# Patient Record
Sex: FEMALE | Race: WHITE | NOT HISPANIC OR LATINO | Employment: FULL TIME | URBAN - METROPOLITAN AREA
[De-identification: names, ages, dates, MRNs, and addresses within clinical notes are randomized per-mention and may not be internally consistent; named-entity substitution may affect disease eponyms.]

---

## 2023-06-26 ENCOUNTER — OFFICE VISIT (OUTPATIENT)
Dept: PRIMARY CARE | Facility: CLINIC | Age: 58
End: 2023-06-26
Payer: COMMERCIAL

## 2023-06-26 VITALS
DIASTOLIC BLOOD PRESSURE: 70 MMHG | WEIGHT: 149 LBS | BODY MASS INDEX: 24.79 KG/M2 | SYSTOLIC BLOOD PRESSURE: 122 MMHG | OXYGEN SATURATION: 99 % | HEART RATE: 73 BPM

## 2023-06-26 DIAGNOSIS — Z12.39 BREAST SCREENING: ICD-10-CM

## 2023-06-26 DIAGNOSIS — Z00.00 PREVENTATIVE HEALTH CARE: Primary | ICD-10-CM

## 2023-06-26 DIAGNOSIS — R79.89 LOW VITAMIN D LEVEL: ICD-10-CM

## 2023-06-26 DIAGNOSIS — F41.9 ANXIETY: ICD-10-CM

## 2023-06-26 DIAGNOSIS — E03.9 HYPOTHYROIDISM, UNSPECIFIED TYPE: ICD-10-CM

## 2023-06-26 DIAGNOSIS — R00.2 PALPITATIONS: ICD-10-CM

## 2023-06-26 PROCEDURE — 1036F TOBACCO NON-USER: CPT | Performed by: FAMILY MEDICINE

## 2023-06-26 PROCEDURE — 99214 OFFICE O/P EST MOD 30 MIN: CPT | Performed by: FAMILY MEDICINE

## 2023-06-26 RX ORDER — BUSPIRONE HYDROCHLORIDE 5 MG/1
10 TABLET ORAL 2 TIMES DAILY PRN
Qty: 120 TABLET | Refills: 0 | Status: SHIPPED | OUTPATIENT
Start: 2023-06-26 | End: 2023-07-24

## 2023-06-26 RX ORDER — SERTRALINE HYDROCHLORIDE 25 MG/1
TABLET, FILM COATED ORAL
COMMUNITY
End: 2024-05-13 | Stop reason: SDUPTHER

## 2023-06-26 RX ORDER — CYCLOBENZAPRINE HCL 5 MG
5 TABLET ORAL 3 TIMES DAILY PRN
COMMUNITY

## 2023-06-26 RX ORDER — LEVOTHYROXINE SODIUM 25 UG/1
25 TABLET ORAL DAILY
COMMUNITY
End: 2023-07-24 | Stop reason: SDUPTHER

## 2023-06-26 ASSESSMENT — PATIENT HEALTH QUESTIONNAIRE - PHQ9
SUM OF ALL RESPONSES TO PHQ9 QUESTIONS 1 AND 2: 0
2. FEELING DOWN, DEPRESSED OR HOPELESS: NOT AT ALL
1. LITTLE INTEREST OR PLEASURE IN DOING THINGS: NOT AT ALL

## 2023-06-26 NOTE — PATIENT INSTRUCTIONS
Please get updated fasting blood work    Please also get the Zio patch placed in cardiology.     Please start a magnesium glycinate 400 mg at bedtime or restart the Calm at bedtime.     Please also continue the sertraline 100 mg daily.     You can also start the buspar as needed for panic/ anxiety. ( We could always consider a low dose of propranolol 10 mg as needed)     Mammogram was due in October 2023.     Please follow up in 6 weeks.

## 2023-06-26 NOTE — PROGRESS NOTES
Subjective   Christen Guerra is a 57 y.o. female who presents for Anxiety (Increase in anxiety / heart palpitations in last week, sob when working out).    HPI    #) palpitation  - occasionally wake from sleep   - no chest pains  - increased stress at home and at work   - started about one week ago   - fever, chills  - sleep not great, gets up around 2-3 am every am and then can fall back to sleep for 1+ hours.   - exercise 5 days per week   - no dizziness  - no chest pains  - chest heaviness associated with the palpitations   - no arrhythmia in the family    #) Anxiety with insomnia - still having break through symptoms  - on sertraline 25 mg 4 times per day   - diff falling and staying asleep     #) low thyroid- recheck TSH now   - on levothyroxine 25 mcg     #) Preventative   PAP 5/30/19 ASCUS/ HPV NEG  MAMMO 10/12/2021  DEXA NEVER  COLON 5-14-18 ( 10 YEARS )     ROS was completed and all systems are negative with the exception of what was noted in the the HPI.     Objective     /70   Pulse 73   Wt 67.6 kg (149 lb)   SpO2 99%   BMI 24.79 kg/m²      Physical Exam  GEN: A+O, no acute distress  HEENT: NC/AT, Oropharynx clear, no exudates, TM visualized, Extraoccular muscles intact, no facial droop; no thyromegaly or cervical LAD  RESP: CTAB, no wheezes   CV: RRR, no murmurs  ABD: soft, non-tender, + BS  SKIN: no rashes or bruising, no peripheral edema   NEURO: CN II-XII grossly intact, moves all extremities equally, no tremor   PSYCH: normal affect, appropriate mood     Assessment/Plan   Problem List Items Addressed This Visit    None    Please get updated fasting blood work    Please also get the Zio patch placed in cardiology.     Please start a magnesium glycinate 400 mg at bedtime or restart the Calm at bedtime.     Please also continue the sertraline 100 mg daily.     You can also start the buspar as needed for panic/ anxiety. ( We could always consider a low dose of propranolol 10 mg as needed)      Mammogram was due in October 2023.     Please follow up in 6 weeks.        Chely Jean DO, [facility], [facility]  [address]   [address]   [place] [address]  Ph. [phone]  Fx. [phone]

## 2023-07-06 ENCOUNTER — LAB (OUTPATIENT)
Dept: LAB | Facility: LAB | Age: 58
End: 2023-07-06
Payer: COMMERCIAL

## 2023-07-06 DIAGNOSIS — Z00.00 PREVENTATIVE HEALTH CARE: ICD-10-CM

## 2023-07-06 DIAGNOSIS — E03.9 HYPOTHYROIDISM, UNSPECIFIED TYPE: ICD-10-CM

## 2023-07-06 DIAGNOSIS — R00.2 PALPITATIONS: ICD-10-CM

## 2023-07-06 DIAGNOSIS — R79.89 LOW VITAMIN D LEVEL: ICD-10-CM

## 2023-07-06 LAB
ALANINE AMINOTRANSFERASE (SGPT) (U/L) IN SER/PLAS: 16 U/L (ref 7–45)
ALBUMIN (G/DL) IN SER/PLAS: 4.6 G/DL (ref 3.4–5)
ALKALINE PHOSPHATASE (U/L) IN SER/PLAS: 41 U/L (ref 33–110)
ANION GAP IN SER/PLAS: 10 MMOL/L (ref 10–20)
ASPARTATE AMINOTRANSFERASE (SGOT) (U/L) IN SER/PLAS: 23 U/L (ref 9–39)
BASOPHILS (10*3/UL) IN BLOOD BY AUTOMATED COUNT: 0.05 X10E9/L (ref 0–0.1)
BASOPHILS/100 LEUKOCYTES IN BLOOD BY AUTOMATED COUNT: 1.1 % (ref 0–2)
BILIRUBIN TOTAL (MG/DL) IN SER/PLAS: 0.7 MG/DL (ref 0–1.2)
CALCIDIOL (25 OH VITAMIN D3) (NG/ML) IN SER/PLAS: 33 NG/ML
CALCIUM (MG/DL) IN SER/PLAS: 9.3 MG/DL (ref 8.6–10.3)
CARBON DIOXIDE, TOTAL (MMOL/L) IN SER/PLAS: 28 MMOL/L (ref 21–32)
CHLORIDE (MMOL/L) IN SER/PLAS: 103 MMOL/L (ref 98–107)
CHOLESTEROL (MG/DL) IN SER/PLAS: 224 MG/DL (ref 0–199)
CHOLESTEROL IN HDL (MG/DL) IN SER/PLAS: 82.3 MG/DL
CHOLESTEROL/HDL RATIO: 2.7
CREATININE (MG/DL) IN SER/PLAS: 0.82 MG/DL (ref 0.5–1.05)
EOSINOPHILS (10*3/UL) IN BLOOD BY AUTOMATED COUNT: 0.06 X10E9/L (ref 0–0.7)
EOSINOPHILS/100 LEUKOCYTES IN BLOOD BY AUTOMATED COUNT: 1.3 % (ref 0–6)
ERYTHROCYTE DISTRIBUTION WIDTH (RATIO) BY AUTOMATED COUNT: 13.3 % (ref 11.5–14.5)
ERYTHROCYTE MEAN CORPUSCULAR HEMOGLOBIN CONCENTRATION (G/DL) BY AUTOMATED: 32.9 G/DL (ref 32–36)
ERYTHROCYTE MEAN CORPUSCULAR VOLUME (FL) BY AUTOMATED COUNT: 91 FL (ref 80–100)
ERYTHROCYTES (10*6/UL) IN BLOOD BY AUTOMATED COUNT: 4.6 X10E12/L (ref 4–5.2)
GFR FEMALE: 83 ML/MIN/1.73M2
GLUCOSE (MG/DL) IN SER/PLAS: 79 MG/DL (ref 74–99)
HEMATOCRIT (%) IN BLOOD BY AUTOMATED COUNT: 42 % (ref 36–46)
HEMOGLOBIN (G/DL) IN BLOOD: 13.8 G/DL (ref 12–16)
IMMATURE GRANULOCYTES/100 LEUKOCYTES IN BLOOD BY AUTOMATED COUNT: 0.2 % (ref 0–0.9)
LDL: 119 MG/DL (ref 0–99)
LEUKOCYTES (10*3/UL) IN BLOOD BY AUTOMATED COUNT: 4.5 X10E9/L (ref 4.4–11.3)
LYMPHOCYTES (10*3/UL) IN BLOOD BY AUTOMATED COUNT: 1.85 X10E9/L (ref 1.2–4.8)
LYMPHOCYTES/100 LEUKOCYTES IN BLOOD BY AUTOMATED COUNT: 40.7 % (ref 13–44)
MAGNESIUM (MG/DL) IN SER/PLAS: 2.11 MG/DL (ref 1.6–2.4)
MONOCYTES (10*3/UL) IN BLOOD BY AUTOMATED COUNT: 0.4 X10E9/L (ref 0.1–1)
MONOCYTES/100 LEUKOCYTES IN BLOOD BY AUTOMATED COUNT: 8.8 % (ref 2–10)
NEUTROPHILS (10*3/UL) IN BLOOD BY AUTOMATED COUNT: 2.17 X10E9/L (ref 1.2–7.7)
NEUTROPHILS/100 LEUKOCYTES IN BLOOD BY AUTOMATED COUNT: 47.9 % (ref 40–80)
PLATELETS (10*3/UL) IN BLOOD AUTOMATED COUNT: 251 X10E9/L (ref 150–450)
POTASSIUM (MMOL/L) IN SER/PLAS: 3.8 MMOL/L (ref 3.5–5.3)
PROTEIN TOTAL: 7.1 G/DL (ref 6.4–8.2)
SODIUM (MMOL/L) IN SER/PLAS: 137 MMOL/L (ref 136–145)
THYROPEROXIDASE AB (IU/ML) IN SER/PLAS: 36 IU/ML
THYROTROPIN (MIU/L) IN SER/PLAS BY DETECTION LIMIT <= 0.05 MIU/L: 4.31 MIU/L (ref 0.44–3.98)
THYROXINE (T4) FREE (NG/DL) IN SER/PLAS: 0.86 NG/DL (ref 0.61–1.12)
TRIGLYCERIDE (MG/DL) IN SER/PLAS: 116 MG/DL (ref 0–149)
TRIIODOTHYRONINE (T3) FREE (PG/ML) IN SER/PLAS: 3 PG/ML (ref 2.3–4.2)
UREA NITROGEN (MG/DL) IN SER/PLAS: 14 MG/DL (ref 6–23)
VLDL: 23 MG/DL (ref 0–40)

## 2023-07-06 PROCEDURE — 80061 LIPID PANEL: CPT

## 2023-07-06 PROCEDURE — 83735 ASSAY OF MAGNESIUM: CPT

## 2023-07-06 PROCEDURE — 86376 MICROSOMAL ANTIBODY EACH: CPT

## 2023-07-06 PROCEDURE — 80053 COMPREHEN METABOLIC PANEL: CPT

## 2023-07-06 PROCEDURE — 36415 COLL VENOUS BLD VENIPUNCTURE: CPT

## 2023-07-06 PROCEDURE — 85025 COMPLETE CBC W/AUTO DIFF WBC: CPT

## 2023-07-06 PROCEDURE — 86800 THYROGLOBULIN ANTIBODY: CPT

## 2023-07-06 PROCEDURE — 84481 FREE ASSAY (FT-3): CPT

## 2023-07-06 PROCEDURE — 82306 VITAMIN D 25 HYDROXY: CPT

## 2023-07-06 PROCEDURE — 84439 ASSAY OF FREE THYROXINE: CPT

## 2023-07-06 PROCEDURE — 84443 ASSAY THYROID STIM HORMONE: CPT

## 2023-07-10 LAB — THYROGLOBULIN AB (IU/ML) IN SER/PLAS: <0.9 IU/ML (ref 0–4)

## 2023-07-23 DIAGNOSIS — R00.2 PALPITATIONS: ICD-10-CM

## 2023-07-23 DIAGNOSIS — E03.9 HYPOTHYROIDISM, UNSPECIFIED TYPE: ICD-10-CM

## 2023-07-23 DIAGNOSIS — F41.9 ANXIETY: ICD-10-CM

## 2023-07-24 RX ORDER — BUSPIRONE HYDROCHLORIDE 5 MG/1
10 TABLET ORAL 2 TIMES DAILY
Qty: 360 TABLET | Refills: 3 | Status: SHIPPED | OUTPATIENT
Start: 2023-07-24 | End: 2024-07-23

## 2023-07-24 RX ORDER — LEVOTHYROXINE SODIUM 25 UG/1
37.5 TABLET ORAL DAILY
Qty: 135 TABLET | Refills: 1 | Status: SHIPPED | OUTPATIENT
Start: 2023-07-24 | End: 2024-02-26

## 2023-08-01 ENCOUNTER — APPOINTMENT (OUTPATIENT)
Dept: PRIMARY CARE | Facility: CLINIC | Age: 58
End: 2023-08-01
Payer: COMMERCIAL

## 2023-09-07 ENCOUNTER — OFFICE VISIT (OUTPATIENT)
Dept: PRIMARY CARE | Facility: CLINIC | Age: 58
End: 2023-09-07
Payer: COMMERCIAL

## 2023-09-07 VITALS
OXYGEN SATURATION: 98 % | WEIGHT: 150 LBS | SYSTOLIC BLOOD PRESSURE: 116 MMHG | BODY MASS INDEX: 24.96 KG/M2 | DIASTOLIC BLOOD PRESSURE: 70 MMHG | HEART RATE: 72 BPM

## 2023-09-07 DIAGNOSIS — R00.2 PALPITATIONS: Primary | ICD-10-CM

## 2023-09-07 DIAGNOSIS — F41.9 ANXIETY: ICD-10-CM

## 2023-09-07 DIAGNOSIS — Z12.39 BREAST SCREENING: ICD-10-CM

## 2023-09-07 DIAGNOSIS — R07.89 ATYPICAL CHEST PAIN: ICD-10-CM

## 2023-09-07 PROCEDURE — 1036F TOBACCO NON-USER: CPT | Performed by: FAMILY MEDICINE

## 2023-09-07 PROCEDURE — 99214 OFFICE O/P EST MOD 30 MIN: CPT | Performed by: FAMILY MEDICINE

## 2023-09-07 RX ORDER — PROPRANOLOL HYDROCHLORIDE 60 MG/1
60 CAPSULE, EXTENDED RELEASE ORAL DAILY
Qty: 30 CAPSULE | Refills: 11 | Status: SHIPPED | OUTPATIENT
Start: 2023-09-07 | End: 2023-10-03

## 2023-09-07 ASSESSMENT — PATIENT HEALTH QUESTIONNAIRE - PHQ9
SUM OF ALL RESPONSES TO PHQ9 QUESTIONS 1 AND 2: 0
1. LITTLE INTEREST OR PLEASURE IN DOING THINGS: NOT AT ALL
2. FEELING DOWN, DEPRESSED OR HOPELESS: NOT AT ALL

## 2023-09-07 NOTE — PROGRESS NOTES
Subjective   Christen Guerra is a 57 y.o. female who presents for Follow-up (Follow up on holter).    HPI    #) palpitation  - with heaviness in the chest, needs to stop more to cartch the breath   - had COVID 1 year ago in May (16 months ago)  took a while to recover  - occasionally wake from sleep - lousy sleep  - not much snoring, cyclical sleep patterns, maybe feels hormonal   - increased stress at home and at work   - sleep not great, gets up around 2-3 am every am and then can fall back to sleep for 1+ hours.   - exercise 5 days per week   - no dizziness  - no chest pains, no cough  - slight wheezing sensation  - chest heaviness associated with the palpitations   - no arrhythmia in the family    #) Anxiety with insomnia - still having break through symptoms  - on sertraline 25 mg 4 times per day   - will trial the propranolol at bedtime.  - diff falling and staying asleep     #) low thyroid- recheck TSH now   - on levothyroxine 25 mcg     #) Preventative   PAP 5/30/19 ASCUS/ HPV NEG  MAMMO 10/12/2021  DEXA NEVER  COLON 5-14-18 ( 10 YEARS )     ROS was completed and all systems are negative with the exception of what was noted in the the HPI.     Objective     /70   Pulse 72   Wt 68 kg (150 lb)   SpO2 98%   BMI 24.96 kg/m²      Physical Exam  GEN: A+O, no acute distress  HEENT: NC/AT, Oropharynx clear, no exudates, TM visualized, Extraoccular muscles intact, no facial droop; no thyromegaly or cervical LAD  RESP: CTAB, no wheezes   CV: RRR, no murmurs  ABD: soft, non-tender, + BS  SKIN: no rashes or bruising, no peripheral edema   NEURO: CN II-XII grossly intact, moves all extremities equally, no tremor   PSYCH: normal affect, appropriate mood     Assessment/Plan     Labs reviewed  Even though your total cholesterol levels went up, it is because you have amazing HDL (good cholesterol levels at 82). Your TSH was now slightly elevated, with negative thyroid antibodies, and a Free T3 of 3. And T4 of 0.86  (both low normal ranges).... we could consider a slight increase in the levothyroxine from 25 to 50? Normal blood counts, no evidence of anemia or infection, normal electrolytes, and your kidney function improved from a GFR of 78 to 83. Normal liver function, normal Magnesium. Vitamin D went from 27 to 33, goal is to get this around 60, please continue supplement     We reviewed you Zio patch, looked good  Please schedule the stress test.     Please continue the magnesium glycinate 400 mg at bedtime    Please also continue the sertraline 100 mg daily.     Please start the propranolol at bedtime for the palpations, anxiety and insomnia.     Mammogram was due in October 2023. Order given.     Please follow up in 2-3 months        Chely Jean DO, [facility], [facility]  [address]   [address]   [place] [address]  Ph. [phone]  Fx. [phone]

## 2023-09-07 NOTE — PATIENT INSTRUCTIONS
Labs reviewed  Even though your total cholesterol levels went up, it is because you have amazing HDL (good cholesterol levels at 82). Your TSH was now slightly elevated, with negative thyroid antibodies, and a Free T3 of 3. And T4 of 0.86 (both low normal ranges).... we could consider a slight increase in the levothyroxine from 25 to 50? Normal blood counts, no evidence of anemia or infection, normal electrolytes, and your kidney function improved from a GFR of 78 to 83. Normal liver function, normal Magnesium. Vitamin D went from 27 to 33, goal is to get this around 60, please continue supplement     We reviewed you Zio patch, looked good  Please schedule the stress test.     Please continue the magnesium glycinate 400 mg at bedtime    Please also continue the sertraline 100 mg daily.     Please start the propranolol at bedtime for the palpations, anxiety and insomnia.     Mammogram was due in October 2023. Order given.     Please follow up in 2-3 months

## 2023-09-21 ENCOUNTER — APPOINTMENT (OUTPATIENT)
Dept: PRIMARY CARE | Facility: CLINIC | Age: 58
End: 2023-09-21
Payer: COMMERCIAL

## 2023-10-02 DIAGNOSIS — R00.2 PALPITATIONS: ICD-10-CM

## 2023-10-02 DIAGNOSIS — F41.9 ANXIETY: ICD-10-CM

## 2023-10-03 ENCOUNTER — PHARMACY VISIT (OUTPATIENT)
Dept: PHARMACY | Facility: CLINIC | Age: 58
End: 2023-10-03
Payer: COMMERCIAL

## 2023-10-03 PROCEDURE — RXMED WILLOW AMBULATORY MEDICATION CHARGE

## 2023-10-03 RX ORDER — PROPRANOLOL HYDROCHLORIDE 60 MG/1
60 CAPSULE, EXTENDED RELEASE ORAL DAILY
Qty: 90 CAPSULE | Refills: 3 | Status: SHIPPED | OUTPATIENT
Start: 2023-10-03 | End: 2024-05-13 | Stop reason: WASHOUT

## 2023-10-04 ENCOUNTER — PHARMACY VISIT (OUTPATIENT)
Dept: PHARMACY | Facility: CLINIC | Age: 58
End: 2023-10-04
Payer: COMMERCIAL

## 2023-10-04 PROCEDURE — RXOTC WILLOW AMBULATORY OTC CHARGE

## 2023-10-04 PROCEDURE — RXMED WILLOW AMBULATORY MEDICATION CHARGE

## 2023-10-13 ENCOUNTER — PHARMACY VISIT (OUTPATIENT)
Dept: PHARMACY | Facility: CLINIC | Age: 58
End: 2023-10-13
Payer: COMMERCIAL

## 2023-10-13 ENCOUNTER — SPECIALTY PHARMACY (OUTPATIENT)
Dept: PHARMACY | Facility: CLINIC | Age: 58
End: 2023-10-13

## 2023-11-29 ENCOUNTER — TELEPHONE (OUTPATIENT)
Dept: PHYSICAL MEDICINE AND REHAB | Facility: CLINIC | Age: 58
End: 2023-11-29
Payer: COMMERCIAL

## 2023-11-29 ENCOUNTER — PHARMACY VISIT (OUTPATIENT)
Dept: PHARMACY | Facility: CLINIC | Age: 58
End: 2023-11-29
Payer: COMMERCIAL

## 2023-11-29 DIAGNOSIS — G24.3 CERVICAL DYSTONIA: Primary | ICD-10-CM

## 2023-11-29 NOTE — TELEPHONE ENCOUNTER
Patient scheduled an appt for more Xeomin injections for 12/28/23, states she canceled her last appointment to follow up on how the Xeomin was working because she was doing well and wants the same dosage ordered. According to aEMR you last ordered 150U, can you call this into [facility] please. PA still good.

## 2023-12-12 ENCOUNTER — SPECIALTY PHARMACY (OUTPATIENT)
Dept: PHARMACY | Facility: CLINIC | Age: 58
End: 2023-12-12

## 2023-12-12 DIAGNOSIS — G24.3 CERVICAL DYSTONIA: ICD-10-CM

## 2023-12-14 PROCEDURE — RXMED WILLOW AMBULATORY MEDICATION CHARGE

## 2023-12-26 ENCOUNTER — PHARMACY VISIT (OUTPATIENT)
Dept: PHARMACY | Facility: CLINIC | Age: 58
End: 2023-12-26
Payer: COMMERCIAL

## 2023-12-27 ENCOUNTER — TELEPHONE (OUTPATIENT)
Dept: PHYSICAL MEDICINE AND REHAB | Facility: CLINIC | Age: 58
End: 2023-12-27
Payer: COMMERCIAL

## 2023-12-27 NOTE — PATIENT INSTRUCTIONS
-Ice on and off for the next 24 hours if injection sites are sore. Do gentle range of motion exercises. Try to do them every hour for about half a minute or so, in every direction that the affected part goes. No pool, bath, or hot tub today. Avoid heavy lifting for the next 2 days.   -Continue Flexeril as needed   -consider other medications in the future  -Continue daily home exercises and strength training  -Consider referral for facet block/RFA versus cervical MIMI  -Consider repeat EMG in the future  -Follow-up 3 months for repeat xeomin injections

## 2023-12-27 NOTE — TELEPHONE ENCOUNTER
Received Xeomin 100U x 1 vial  NDC 7601391706  [facility]  Exp 12/2025  Lot [id number]    Xeomin 50U x 1 vial  NDC 4362626975  [facility]  Exp 02/2025  Lot [id number]

## 2023-12-27 NOTE — PROGRESS NOTES
Provider Impressions     This is a pleasant 58-year-old right-handed woman with past medical history significant for anxiety, hypothyroidism, who presents for follow-up of neck pain. Physical exam is notable for slight triceps weakness on the right, we will monitor closely. Symptoms and physical exam findings consistent with cervical spondylosis and reactive myofascial pain/tension, C7 radiculopathy.     -Bilateral neck and upper back Xeomin injections performed as above. There were no complications and she tolerated the procedure well. She was provided with postprocedure instructions.  -Continue Flexeril as needed   -consider other medications in the future  -Continue daily home exercises and strength training  -Consider referral for facet block/RFA versus cervical MIMI  -Consider repeat EMG in the future  -Follow-up 3 months for repeat xeomin injections      The patient expressed understanding and agreement with the assessment and plan. Patient encouraged to contact us should they have any questions, concerns, or any changes in symptoms.      Thank you for allowing me to participate in the care of your patient.        ** Dictated with voice recognition software, please forgive any errors in grammar and/or spelling **         Chief Complaint     Neck pain follow-up      History of Present Illness    This is a pleasant 58-year-old right-handed woman with past medical history significant for anxiety, hypothyroidism, who presents for follow-up neck pain.     She was last seen here on  8/25/2023 , at which point I did repeat bilateral upper back and neck trigger point  injections.    She is here today for repeat Xeomin injections.    Advised to continue Flexeril as needed and to continue daily home exercises.      She rates her pain as a 5/10, previously 4/10.      Otherwise, there have been no changes to her medications or past medical history since last visit.     ________________________  2/24/2023: Bilateral neck  "trigger point injections, try patches, consider other medications, consider other injections, consider Botox  3/24/2023: : Bilateral neck trigger point injections, try patches, consider other medications, consider other injections, consider Botox   5/24/2023: Bilateral neck xeomin injections,consider other medications, consider other injections, continue HEP  6/20/2023: We will increase Xeomin slightly next time, consider other injections and medications, continue HEP  7/18/23: Bilateral neck xeomin injections,consider other medications, consider other injections, continue HEP, slightly more xeomin next time in some of the muscles  8/25/2023: Bilateral neck trigger point injections, continue medications, consider other injections, continue exercises  12/28/2023:Bilateral neck Xeomin injections, continue medications, consider other injections, continue exercises  ______________________  As a reminder:     TIMELINE OF COMPLAINT(S):     She has been having neck pain on and off for the past 25 to 30 years. There was no inciting or traumatic event, but in her early 30s, she was knocked over by a sled rider and fell onto her head. She remembers starting to have neck issues after that. She said \"that is how I got my straight neck\". For the past 5 years, things have been getting worse slowly and she is now finding herself taking ibuprofen every other day and Flexeril a couple times a week at night which helps. The pain can radiate into her right temple at times and be associated with nausea as well.     It has been affecting her day-to-day life, work life, and sleep. Her primary care physician sent her here to consider trigger point injections.     Of note, she had right carpal tunnel release with Dr. Rios in 2011, helped.     Pain:  LOCATION-bilateral lateral neck R>L  RADIATION- Trapz feel tight  ASSOCIATED WITH- nausea, no falls  NUMBNESS/TINGLING-sometimes both hands feel tingling in the morning for the past 6 " "months  WEAKNESS-no  CONSTANT or INTERMITTENT-intermittent, but can wake up at night from it  SEVERITY/QUANTITY- 4/10, 7/10 at its worst, last time a couple of weeks ago  QUALITY- achy, pressure, gnawing  EXACERBATED BY- sometimes worse in the mornings, time  BETTER WITH- ibuprofen, heat, flexeril  TRIED-   Anti-Inflammatories: ibuprofen helps, Medrol Dosepak (not for this), previously meloxicam (doesn't remember) Celebrex (increases appetite)   Muscle relaxants: Flexeril helps   Anti-depressants:   Neuroleptics:   LDN:     PHYSICAL THERAPY: Several years ago, helped a little, still does HEP several times a week, does weighs at the gym on her own.  TENS unit: Yes, hasn't helped for neck. Helped for \"sciatica\"  CHIROPRACTIC MANIPULATION: Yes, temp relief  ACUPUNCTURE TREATMENTS: Yes, temp relief  DEEP TISSUE MASSAGE THERAPY: Yes, temp relief  OSTEOPATHIC MANIPULATION THERAPY: yes, Dr. Jean helps but wears off  INJECTIONS: R C-2-3-4 Cervical facet blocks Dr. Urbina 2/5/19, helped for several months but caused some some vertigo.   EMG/NCS: Dr. Son 4/26/11: Mild R CTS     IMAGING:     Cervical MRI 1/30/2023: Multilevel degenerative disc disease and facet arthrosis without significant spinal canal stenosis. There is moderate neuroforaminal narrowing at C6/C7, slightly increased degenerative changes since MRI from 2017.     Cervical spine x-ray 2/16/2017: Mild degenerative changes in several levels. There is straightening of the upper cervical curvature.     FUNCTIONAL HISTORY: The patient is independent in all ADLs, mobility, and driving. The patient does not use any assistive device.     SH:  Lives in: [place]  Lives with:   Occupation: Phlebotomist  Tobacco: No  Alcohol: Social, weekends  Drugs: No     _________________________  ROS: The patient denies any bowel or bladder incontinence/accidents, night sweats, fevers, chills, recent significant weight loss. A 14 point review of systems was reviewed with " the patient and is as above and otherwise negative. ROS questionnaire positive for neck pain       Physical Exam  GEN - Alert, well-developed, well-nourished, no acute distress  PSYCH - Cooperative, appropriate mood and affect  HEENT - NC/AT  RESP - Non-labored respirations, equal expansion  CV - warm and well-perfused, No cyanosis or edema in extremities.   ABD- soft, ND  SKIN - No rash.     Tenderness in bilateral scalene and SCM muscles     Previous NECK/EXTR- Cervical ROM is full with forward flexion, extension, rotation, but limited in sidebending right worse than left, with provocation of discomfort but no significant pain. There was discomfort with facet loading bilaterally.. Spurlings and Lhermitte's negative. No tenderness of the cervical spinous processes. No tenderness of the cervical paraspinal muscles and trapezei musculature but there was tenderness of bilateral scalene, SCM muscles.      Previous NEURO - UE strength 5/5 - including shoulder abduction, biceps, triceps (except 5 -/5 on the right), wrist extensors, finger flexors, interossei, and    Sensation - intact to light touch in bilateral upper extremities.   Reflexes - 2+ biceps, brachioradialis, triceps, reflexes bilaterally, Staley's negative bilaterally  GAIT - Normal base, normal stride length, non-antalgic. Able to toe walk and heel walk without difficulty.      Procedure  Xeomin 100 units/vial X 1 vial, 100 units used, 0 units wasted ( Pharmacy)  NDC 8240-8329-98  Lot: [id number]  Exp: 12/2025  Manuf: Jennifer     Xeomin 50 units/vial X 1 vial, 40 units used, 10 units wasted ( Pharmacy)  NDC 9977-8246-44  Lot: [id number]  Exp: 02/2025  Manuf: Jennifer     Sodium Chloride 0.9%- 1 cc's used, 8.5 cc's wasted X 1.5  10 mL Single dose  NDC: 6070-0509-55  LOT: [id number]  EXP: 2/01/2024  Manuf: Hospira        Botulinum toxin A injections:   Cervical dystonia     Counseling: We discussed the mechanism of action of botulinum toxin, the physiology of the  "neuromuscular junction. Over half of this 30 minute encounter was devoted to counseling and education.      Consent: The risks and benefits of the procedure were explained in great detail, including risks of allergic reaction, bruising, infection, too much reaction/weakness, no effect, damage to nearby structures. All questions were answered. Written, informed consent was obtained and placed in the chart.      Procedure in detail: \"Time out\" protocol was followed. The skin was prepped with alcohol, and using a sterile, 27 gauge, 2-inch electrode needle and EMG muscle localization using a Clavis, a total of 140 units of botulinum toxin A (1:1 botox saline mixture) was injected to the following muscles of the upper back and neck after negative aspiration:     Trapezius 35/35  Levator scapulae 20/20  Cervical paraspinal 5/5  Longissimus 5/5  Posterior scalene 5/5     The patient tolerated this well and was given post procedure instructions.      "

## 2023-12-28 ENCOUNTER — PROCEDURE VISIT (OUTPATIENT)
Dept: PHYSICAL MEDICINE AND REHAB | Facility: CLINIC | Age: 58
End: 2023-12-28
Payer: COMMERCIAL

## 2023-12-28 VITALS
BODY MASS INDEX: 25.16 KG/M2 | HEART RATE: 77 BPM | SYSTOLIC BLOOD PRESSURE: 129 MMHG | WEIGHT: 151 LBS | DIASTOLIC BLOOD PRESSURE: 73 MMHG | HEIGHT: 65 IN | OXYGEN SATURATION: 98 % | TEMPERATURE: 97.7 F

## 2023-12-28 DIAGNOSIS — G24.3 CERVICAL DYSTONIA: ICD-10-CM

## 2023-12-28 PROCEDURE — 95874 GUIDE NERV DESTR NEEDLE EMG: CPT | Performed by: PHYSICAL MEDICINE & REHABILITATION

## 2023-12-28 PROCEDURE — 64616 CHEMODENERV MUSC NECK DYSTON: CPT | Performed by: PHYSICAL MEDICINE & REHABILITATION

## 2023-12-28 ASSESSMENT — PAIN SCALES - GENERAL: PAINLEVEL: 5

## 2024-01-04 PROCEDURE — RXMED WILLOW AMBULATORY MEDICATION CHARGE

## 2024-01-06 ENCOUNTER — PHARMACY VISIT (OUTPATIENT)
Dept: PHARMACY | Facility: CLINIC | Age: 59
End: 2024-01-06
Payer: COMMERCIAL

## 2024-01-16 ENCOUNTER — APPOINTMENT (OUTPATIENT)
Dept: RADIOLOGY | Facility: CLINIC | Age: 59
End: 2024-01-16
Payer: COMMERCIAL

## 2024-01-19 ENCOUNTER — APPOINTMENT (OUTPATIENT)
Dept: RADIOLOGY | Facility: CLINIC | Age: 59
End: 2024-01-19
Payer: COMMERCIAL

## 2024-01-24 ENCOUNTER — TELEPHONE (OUTPATIENT)
Dept: PHYSICAL MEDICINE AND REHAB | Facility: CLINIC | Age: 59
End: 2024-01-24
Payer: COMMERCIAL

## 2024-01-24 NOTE — PROGRESS NOTES
Provider Impressions     This is a pleasant 58-year-old right-handed woman with past medical history significant for anxiety, hypothyroidism, who presents for follow-up of neck pain. Physical exam is notable for slight triceps weakness on the right, we will monitor closely. Symptoms and physical exam findings consistent with cervical spondylosis and reactive myofascial pain/tension, C7 radiculopathy.     -Bilateral neck and upper back trigger point injections performed as above. There were no complications and she tolerated the procedure well. She was provided with postprocedure instructions.  -Try gabapentin at night, up to 600 mg   -Try robaxin, up to 4 times per day  -Continue Flexeril as needed   -consider other medications in the future including medrol dose pack  -Consider switching to botox or xeomin.  -Continue daily home exercises and strength training  -Consider referral for facet block/RFA versus cervical MIMI  -Consider repeat EMG in the future  -Follow-up 6-8 weeks     The patient expressed understanding and agreement with the assessment and plan. Patient encouraged to contact us should they have any questions, concerns, or any changes in symptoms.      Thank you for allowing me to participate in the care of your patient.        ** Dictated with voice recognition software, please forgive any errors in grammar and/or spelling **         Chief Complaint     Neck pain follow-up      History of Present Illness    This is a pleasant 58-year-old right-handed woman with past medical history significant for anxiety, hypothyroidism, who presents for follow-up neck pain.     She was last seen here on 12/20/2023, at which point I did repeat bilateral Xeomin injections.  This time however it did not help much, and she is felt increased pain on her right neck, going down the right arm to the lateral elbow mostly.    I advised her to continue Flexeril as needed and to continue daily exercises.  She takes Flexeril  "rarely at night, she does not like taking medications, but she has been having a very difficult time sleeping.    She is doing her exercises.  But they are hard for her to do and her job makes her pain worse.  She is flying tomorrow to [place] for a week because her daughter had a child.     She rates her pain as a 5-7/10, previously 5/10.      Otherwise, there have been no changes to her medications or past medical history since last visit.     ________________________  2/24/2023: Bilateral neck trigger point injections, try patches, consider other medications, consider other injections, consider Botox  3/24/2023: : Bilateral neck trigger point injections, try patches, consider other medications, consider other injections, consider Botox   5/24/2023: Bilateral neck xeomin injections,consider other medications, consider other injections, continue HEP  6/20/2023: We will increase Xeomin slightly next time, consider other injections and medications, continue HEP  7/18/23: Bilateral neck xeomin injections,consider other medications, consider other injections, continue HEP, slightly more xeomin next time in some of the muscles  8/25/2023: Bilateral neck trigger point injections, continue medications, consider other injections, continue exercises  12/28/2023:Bilateral neck Xeomin injections, continue medications, consider other injections, continue exercises  1/25/2024:Bilateral upper back and neck trigger point injections, trial of gabapentin, try Robaxin, consider other medications, consider referral for deeper neck injections, consider Botox versus Dysport , Continue exercises  ______________________  As a reminder:     TIMELINE OF COMPLAINT(S):     She has been having neck pain on and off for the past 25 to 30 years. There was no inciting or traumatic event, but in her early 30s, she was knocked over by a sled rider and fell onto her head. She remembers starting to have neck issues after that. She said \"that is " "how I got my straight neck\". For the past 5 years, things have been getting worse slowly and she is now finding herself taking ibuprofen every other day and Flexeril a couple times a week at night which helps. The pain can radiate into her right temple at times and be associated with nausea as well.     It has been affecting her day-to-day life, work life, and sleep. Her primary care physician sent her here to consider trigger point injections.     Of note, she had right carpal tunnel release with Dr. Rios in 2011, helped.     Pain:  LOCATION-bilateral lateral neck R>L  RADIATION- Trapz feel tight  ASSOCIATED WITH- nausea, no falls  NUMBNESS/TINGLING-sometimes both hands feel tingling in the morning for the past 6 months  WEAKNESS-no  CONSTANT or INTERMITTENT-intermittent, but can wake up at night from it  SEVERITY/QUANTITY- 4/10, 7/10 at its worst, last time a couple of weeks ago  QUALITY- achy, pressure, gnawing  EXACERBATED BY- sometimes worse in the mornings, time  BETTER WITH- ibuprofen, heat, flexeril  TRIED-   Anti-Inflammatories: ibuprofen helps, Medrol Dosepak (not for this), previously meloxicam (doesn't remember) Celebrex (increases appetite)   Muscle relaxants: Flexeril helps   Anti-depressants:   Neuroleptics:   LDN:     PHYSICAL THERAPY: Several years ago, helped a little, still does HEP several times a week, does weighs at the gym on her own.  TENS unit: Yes, hasn't helped for neck. Helped for \"sciatica\"  CHIROPRACTIC MANIPULATION: Yes, temp relief  ACUPUNCTURE TREATMENTS: Yes, temp relief  DEEP TISSUE MASSAGE THERAPY: Yes, temp relief  OSTEOPATHIC MANIPULATION THERAPY: yes, Dr. Jean helps but wears off  INJECTIONS: R C-2-3-4 Cervical facet blocks Dr. Urbina 2/5/19, helped for several months but caused some some vertigo temp.   EMG/NCS: Dr. Son 4/26/11: Mild R CTS     IMAGING:     Cervical MRI 1/30/2023: Multilevel degenerative disc disease and facet arthrosis without significant spinal canal " stenosis. There is moderate neuroforaminal narrowing at C6/C7, slightly increased degenerative changes since MRI from 2017.     Cervical spine x-ray 2/16/2017: Mild degenerative changes in several levels. There is straightening of the upper cervical curvature.     FUNCTIONAL HISTORY: The patient is independent in all ADLs, mobility, and driving. The patient does not use any assistive device.     SH:  Lives in: [place]  Lives with:   Occupation: Phlebotomist  Tobacco: No  Alcohol: Social, weekends  Drugs: No     _________________________  ROS: The patient denies any bowel or bladder incontinence/accidents, night sweats, fevers, chills, recent significant weight loss. A 14 point review of systems was reviewed with the patient and is as above and otherwise negative. ROS questionnaire positive for muscle pain/tightness/spasms, limited range of motion      Physical Exam  GEN - Alert, well-developed, well-nourished, no acute distress  PSYCH - Cooperative, appropriate mood and affect  HEENT - NC/AT  RESP - Non-labored respirations, equal expansion  CV - warm and well-perfused, No cyanosis or edema in extremities.   ABD- soft, ND  SKIN - No rash.     Tenderness in bilateral scalene and SCM muscles     Previous NECK/EXTR- Cervical ROM is full with forward flexion, extension, rotation, but limited in sidebending right worse than left, with provocation of discomfort but no significant pain. There was discomfort with facet loading bilaterally.. Spurlings and Lhermitte's negative. No tenderness of the cervical spinous processes. No tenderness of the cervical paraspinal muscles and trapezei musculature but there was tenderness of bilateral scalene, SCM muscles.      Previous NEURO - UE strength 5/5 - including shoulder abduction, biceps, triceps (except 5 -/5 on the right), wrist extensors, finger flexors, interossei, and    Sensation - intact to light touch in bilateral upper extremities.   Reflexes - 2+ biceps,  brachioradialis, triceps, reflexes bilaterally, Staley's negative bilaterally  GAIT - Normal base, normal stride length, non-antalgic. Able to toe walk and heel walk without difficulty.       PROCEDURE:      Lidocaine 1%- 4 cc's used, 0 cc's wasted  200mg/20mL (10mg/mL)  NDC 0503-1420-57  LOT [id number]  EXP 03/01/2025  [facility]    Cervical and Thoracic trigger point injections:      Description of the Procedure: The procedure, risks and alternative treatments were discussed with the patient. After written informed consent was obtained, the trigger points in the cervical paraspinal levator scapulae, upper trapezius, muscles were palpated and marked. The skin was prepped three times with alcohol. Using a 27 gauge 1.5 inch needle, after negative aspiration, the trigger points in each muscle were injected with a total of 4 cc's of 1% lidocaine, spread equally into 6 sites. Twitch responses were observed in the musculature. The patient tolerated the procedure well with no immediate complications or bleeding.      Plan:   1. The patient was instructed in post-procedural care.  2. The patient was asked to apply moist heat and or ice for the next 24 hours and to perform daily gentle stretching exercises.     Physical Exam  HENT:      Head:

## 2024-01-24 NOTE — PATIENT INSTRUCTIONS
-Ice on and off for the next 24 hours if injection sites are sore. Do gentle range of motion exercises in each area that was injected. Try to do them every hour for about half a minute or so, in every direction that the affected part goes. No pool, bath, or hot tub today. Avoid heavy lifting for the next 2 days.    -Try gabapentin at night, up to 600 mg   -Try robaxin, up to 4 times per day  -Continue Flexeril as needed   -consider other medications in the future including medrol dose pack  -Consider switching to botox or xeomin.  -Continue daily home exercises and strength training  -Consider referral for facet block/RFA versus cervical MIMI  -Consider repeat EMG in the future  -Follow-up 6-8 weeks

## 2024-01-25 ENCOUNTER — OFFICE VISIT (OUTPATIENT)
Dept: PHYSICAL MEDICINE AND REHAB | Facility: CLINIC | Age: 59
End: 2024-01-25
Payer: COMMERCIAL

## 2024-01-25 VITALS
WEIGHT: 152 LBS | BODY MASS INDEX: 25.33 KG/M2 | TEMPERATURE: 97.7 F | SYSTOLIC BLOOD PRESSURE: 123 MMHG | DIASTOLIC BLOOD PRESSURE: 77 MMHG | OXYGEN SATURATION: 99 % | HEIGHT: 65 IN | HEART RATE: 71 BPM

## 2024-01-25 DIAGNOSIS — M54.12 CERVICAL RADICULOPATHY: ICD-10-CM

## 2024-01-25 DIAGNOSIS — M50.30 DEGENERATION OF CERVICAL INTERVERTEBRAL DISC: ICD-10-CM

## 2024-01-25 DIAGNOSIS — M47.812 CERVICAL SPONDYLOSIS WITHOUT MYELOPATHY: ICD-10-CM

## 2024-01-25 DIAGNOSIS — G24.3 CERVICAL DYSTONIA: Primary | ICD-10-CM

## 2024-01-25 DIAGNOSIS — M79.18 MYOFASCIAL PAIN: ICD-10-CM

## 2024-01-25 PROCEDURE — 1036F TOBACCO NON-USER: CPT | Performed by: PHYSICAL MEDICINE & REHABILITATION

## 2024-01-25 PROCEDURE — 20552 NJX 1/MLT TRIGGER POINT 1/2: CPT | Performed by: PHYSICAL MEDICINE & REHABILITATION

## 2024-01-25 PROCEDURE — 99214 OFFICE O/P EST MOD 30 MIN: CPT | Performed by: PHYSICAL MEDICINE & REHABILITATION

## 2024-01-25 RX ORDER — METHOCARBAMOL 500 MG/1
500 TABLET, FILM COATED ORAL 4 TIMES DAILY PRN
Qty: 240 TABLET | Refills: 1 | Status: SHIPPED | OUTPATIENT
Start: 2024-01-25 | End: 2024-05-13 | Stop reason: WASHOUT

## 2024-01-25 RX ORDER — GABAPENTIN 100 MG/1
600 CAPSULE ORAL NIGHTLY
Qty: 180 CAPSULE | Refills: 1 | Status: SHIPPED | OUTPATIENT
Start: 2024-01-25 | End: 2024-03-25

## 2024-01-25 ASSESSMENT — PAIN SCALES - GENERAL: PAINLEVEL: 6

## 2024-02-20 ENCOUNTER — HOSPITAL ENCOUNTER (OUTPATIENT)
Dept: RADIOLOGY | Facility: CLINIC | Age: 59
Discharge: HOME | End: 2024-02-20
Payer: COMMERCIAL

## 2024-02-20 VITALS — WEIGHT: 147 LBS | BODY MASS INDEX: 24.49 KG/M2 | HEIGHT: 65 IN

## 2024-02-20 DIAGNOSIS — Z12.39 BREAST SCREENING: ICD-10-CM

## 2024-02-20 PROCEDURE — 77067 SCR MAMMO BI INCL CAD: CPT

## 2024-02-23 ENCOUNTER — SPECIALTY PHARMACY (OUTPATIENT)
Dept: PHARMACY | Facility: CLINIC | Age: 59
End: 2024-02-23

## 2024-02-24 DIAGNOSIS — E03.9 HYPOTHYROIDISM, UNSPECIFIED TYPE: ICD-10-CM

## 2024-02-26 ENCOUNTER — PROCEDURE VISIT (OUTPATIENT)
Dept: PRIMARY CARE | Facility: CLINIC | Age: 59
End: 2024-02-26
Payer: COMMERCIAL

## 2024-02-26 VITALS
HEART RATE: 75 BPM | DIASTOLIC BLOOD PRESSURE: 76 MMHG | SYSTOLIC BLOOD PRESSURE: 145 MMHG | WEIGHT: 151 LBS | OXYGEN SATURATION: 100 % | BODY MASS INDEX: 25.13 KG/M2

## 2024-02-26 DIAGNOSIS — M79.18 MYOFASCIAL PAIN: Primary | ICD-10-CM

## 2024-02-26 DIAGNOSIS — M99.08 SOMATIC DYSFUNCTION OF RIB: ICD-10-CM

## 2024-02-26 DIAGNOSIS — M99.01 SOMATIC DYSFUNCTION OF CERVICAL REGION: ICD-10-CM

## 2024-02-26 DIAGNOSIS — M99.00 SOMATIC DYSFUNCTION OF HEAD REGION: ICD-10-CM

## 2024-02-26 DIAGNOSIS — M47.812 CERVICAL SPONDYLOSIS WITHOUT MYELOPATHY: ICD-10-CM

## 2024-02-26 PROCEDURE — 99213 OFFICE O/P EST LOW 20 MIN: CPT | Performed by: FAMILY MEDICINE

## 2024-02-26 PROCEDURE — 98926 OSTEOPATH MANJ 3-4 REGIONS: CPT | Performed by: FAMILY MEDICINE

## 2024-02-26 RX ORDER — LEVOTHYROXINE SODIUM 25 UG/1
37.5 TABLET ORAL DAILY
Qty: 135 TABLET | Refills: 1 | Status: SHIPPED | OUTPATIENT
Start: 2024-02-26

## 2024-02-26 NOTE — PROGRESS NOTES
Subjective   Christen Crowleyuser is a 58 y.o. female who presents for Procedure (OMT).    HPI    #) chronic cervical pain   Mostly upper back and neck   neck pain - 4/10--> 2-3/10   - more right than left sides  upper back - 3/10  neck stared 30 jake years   sled riding injury - whiplash injury   still seeing a chiro- pat- 3 days per week for 3 months   - saw Dr miller and had botox injections, not much help   doing neck exercises and stretched and foam roller  going to the gym  associated symptoms: some headaches more on the right  - no vision or hearing changes  - no vertigo  - occasional numbness/tinging in to the delt and trap   imaging- some with chiro recently   did also see Dr Urbina for pain management  - injections helped temporarily   CT of the neck several years ago     #) palpitation  - with heaviness in the chest, needs to stop more to cartch the breath   - had COVID 1 year ago in May (16 months ago)  took a while to recover  - occasionally wake from sleep - lousy sleep  - not much snoring, cyclical sleep patterns, maybe feels hormonal   - increased stress at home and at work   - sleep not great, gets up around 2-3 am every am and then can fall back to sleep for 1+ hours.   - exercise 5 days per week   - no dizziness  - no chest pains, no cough  - slight wheezing sensation  - chest heaviness associated with the palpitations   - no arrhythmia in the family    #) Anxiety with insomnia - still having break through symptoms  - on sertraline 25 mg 4 times per day   - will trial the propranolol at bedtime.  - diff falling and staying asleep     #) low thyroid- recheck TSH now   - on levothyroxine 25 mcg     #) Preventative   PAP 5/30/19 ASCUS/ HPV NEG  MAMMO 10/12/2021  DEXA NEVER  COLON 5-14-18 ( 10 YEARS )     ROS was completed and all systems are negative with the exception of what was noted in the the HPI.     Objective     /76   Pulse 75   Wt 68.5 kg (151 lb)   SpO2 100%   BMI 25.13 kg/m²   "    Physical Exam  GEN: A+O, no acute distress  HEENT: NC/AT, Oropharynx clear, no exudates, TM visualized, Extraoccular muscles intact, no facial droop; no thyromegaly or cervical LAD  RESP: CTAB, no wheezes   CV: RRR, no murmurs  ABD: soft, non-tender, + BS  SKIN: no rashes or bruising, no peripheral edema   NEURO: CN II-XII grossly intact, moves all extremities equally, no tremor   PSYCH: normal affect, appropriate mood     Assessment/Plan     Osteopathic Manipulative Treatment was performed today. You may experience some increased soreness and tenderness at the treatment sites. Please increase fluids and consider taking a warm shower later today.      Please continue to engage in \"relative rest\" doing as much exercises / stretching / Physical activities as possible without making your pain or other symptoms worse.      Please continue the TENS unit as needed.      You may wish to use Ice and/or moist heat for 20 minutes up to 4 times per day.   As well as using Medications such as the following for moderate to severe pain:            Acetaminophen 650 mg 3X /Day           Ibuprofen 800 mg q 8 hrs as needed with food     Labs reviewed  Even though your total cholesterol levels went up, it is because you have amazing HDL (good cholesterol levels at 82). Your TSH was now slightly elevated, with negative thyroid antibodies, and a Free T3 of 3. And T4 of 0.86 (both low normal ranges).... we could consider a slight increase in the levothyroxine from 25 to 50? Normal blood counts, no evidence of anemia or infection, normal electrolytes, and your kidney function improved from a GFR of 78 to 83. Normal liver function, normal Magnesium. Vitamin D went from 27 to 33, goal is to get this around 60, please continue supplement     We reviewed you Zio patch, looked good  Please schedule the stress test.     Please continue the magnesium glycinate 400 mg at bedtime    Please also continue the sertraline 100 mg daily.     Please " continue the propranolol at bedtime for the palpations, anxiety and insomnia.     Return to Office / Musculoskeletal Clinic:   _____ Days _2-4___ Wks ____ Mo ____ prn                   Chely Jean DO, [facility] [address]   [address]   [place] [address]  Ph. [phone]  Fx. [phone]

## 2024-02-26 NOTE — PATIENT INSTRUCTIONS
"Osteopathic Manipulative Treatment was performed today. You may experience some increased soreness and tenderness at the treatment sites. Please increase fluids and consider taking a warm shower later today.      Please continue to engage in \"relative rest\" doing as much exercises / stretching / Physical activities as possible without making your pain or other symptoms worse.      Please continue the TENS unit as needed.      You may wish to use Ice and/or moist heat for 20 minutes up to 4 times per day.   As well as using Medications such as the following for moderate to severe pain:            Acetaminophen 650 mg 3X /Day           Ibuprofen 800 mg q 8 hrs as needed with food     Labs reviewed  Even though your total cholesterol levels went up, it is because you have amazing HDL (good cholesterol levels at 82). Your TSH was now slightly elevated, with negative thyroid antibodies, and a Free T3 of 3. And T4 of 0.86 (both low normal ranges).... we could consider a slight increase in the levothyroxine from 25 to 50? Normal blood counts, no evidence of anemia or infection, normal electrolytes, and your kidney function improved from a GFR of 78 to 83. Normal liver function, normal Magnesium. Vitamin D went from 27 to 33, goal is to get this around 60, please continue supplement     We reviewed you Zio patch, looked good  Please schedule the stress test.     Please continue the magnesium glycinate 400 mg at bedtime    Please also continue the sertraline 100 mg daily.     Please continue the propranolol at bedtime for the palpations, anxiety and insomnia.     Return to Office / Musculoskeletal Clinic:   _____ Days _2-4___ Wks ____ Mo ____ prn            "

## 2024-03-07 NOTE — PROGRESS NOTES
Provider Impressions     This is a pleasant 58-year-old right-handed woman with past medical history significant for anxiety, hypothyroidism, who presents for follow-up of neck pain. Physical exam is notable for slight triceps weakness on the right, we will monitor closely. Symptoms and physical exam findings consistent with cervical spondylosis and reactive myofascial pain/tension, C7 radiculopathy.     -Try gabapentin at night, up to 600 mg  if needed  -Try robaxin, up to 4 times per day if needed  -Continue Flexeril as needed   -consider other medications in the future including medrol dose pack  -Consider switching to botox or xeomin.  -Continue daily home exercises and strength training  -Consider referral for facet block/RFA versus cervical MIMI  -Consider repeat EMG in the future  -Follow-up as needed for repeat trigger point injections, message me on [employer]        The patient expressed understanding and agreement with the assessment and plan. Patient encouraged to contact us should they have any questions, concerns, or any changes in symptoms.      Thank you for allowing me to participate in the care of your patient.        ** Dictated with voice recognition software, please forgive any errors in grammar and/or spelling **         Chief Complaint     Neck pain follow-up      History of Present Illness    This is a pleasant 58-year-old right-handed woman with past medical history significant for anxiety, hypothyroidism, who presents for follow-up neck pain.     She was last seen here on 1/25/2024, at which point I did bilateral upper back and neck trigger point injections.   This helped about 75%, still lasting but still talking ibuprofen 3-4 times per weeks.    The previous round of Xeomin injections in December 2023 did not help as much as the rounds before that.      I advised her to continue Flexeril as needed and to continue daily exercises.  She takes Flexeril rarely at night, she does not like taking  medications, but she has been having a very difficult time sleeping.  I advised her to try gabapentin at night up to 600 mg. She also started sleeping with a better pillow. She is now sleeping much better without taking the gabapentin even.     She is doing her exercises.  She had 1 more OMT session with Dr. Jean since our last visit. This helps. Ghl.     She rates her pain as a 3/10, previously  5-7/10.      Otherwise, there have been no changes to her medications or past medical history since last visit.     ________________________  2/24/2023: Bilateral neck trigger point injections, try patches, consider other medications, consider other injections, consider Botox  3/24/2023: : Bilateral neck trigger point injections, try patches, consider other medications, consider other injections, consider Botox   5/24/2023: Bilateral neck xeomin injections,consider other medications, consider other injections, continue HEP  6/20/2023: We will increase Xeomin slightly next time, consider other injections and medications, continue HEP  7/18/23: Bilateral neck xeomin injections,consider other medications, consider other injections, continue HEP, slightly more xeomin next time in some of the muscles  8/25/2023: Bilateral neck trigger point injections, continue medications, consider other injections, continue exercises  12/28/2023:Bilateral neck Xeomin injections, continue medications, consider other injections, continue exercises  1/25/2024:Bilateral upper back and neck trigger point injections, trial of gabapentin, try Robaxin, consider other medications, consider referral for deeper neck injections, consider Botox versus Dysport , Continue exercises  3/8/2024:trial of gabapentin, try Robaxin, consider other medications, consider referral for deeper neck injections, consider Botox versus Dysport , Continue exercises, follow up as needed  ______________________  As a reminder:     TIMELINE OF COMPLAINT(S):     She has been  "having neck pain on and off for the past 25 to 30 years. There was no inciting or traumatic event, but in her early 30s, she was knocked over by a sled rider and fell onto her head. She remembers starting to have neck issues after that. She said \"that is how I got my straight neck\". For the past 5 years, things have been getting worse slowly and she is now finding herself taking ibuprofen every other day and Flexeril a couple times a week at night which helps. The pain can radiate into her right temple at times and be associated with nausea as well.     It has been affecting her day-to-day life, work life, and sleep. Her primary care physician sent her here to consider trigger point injections.     Of note, she had right carpal tunnel release with Dr. Rios in 2011, helped.     Pain:  LOCATION-bilateral lateral neck R>L  RADIATION- Trapz feel tight  ASSOCIATED WITH- nausea, no falls  NUMBNESS/TINGLING-sometimes both hands feel tingling in the morning for the past 6 months  WEAKNESS-no  CONSTANT or INTERMITTENT-intermittent, but can wake up at night from it  SEVERITY/QUANTITY- 4/10, 7/10 at its worst, last time a couple of weeks ago  QUALITY- achy, pressure, gnawing  EXACERBATED BY- sometimes worse in the mornings, time  BETTER WITH- ibuprofen, heat, flexeril  TRIED-   Anti-Inflammatories: ibuprofen helps, Medrol Dosepak (not for this), previously meloxicam (doesn't remember) Celebrex (increases appetite)   Muscle relaxants: Flexeril helps   Anti-depressants:   Neuroleptics:   LDN:     PHYSICAL THERAPY: Several years ago, helped a little, still does HEP several times a week, does weighs at the gym on her own.  TENS unit: Yes, hasn't helped for neck. Helped for \"sciatica\"  CHIROPRACTIC MANIPULATION: Yes, temp relief  ACUPUNCTURE TREATMENTS: Yes, temp relief  DEEP TISSUE MASSAGE THERAPY: Yes, temp relief  OSTEOPATHIC MANIPULATION THERAPY: yes, Dr. Jean helps but wears off  INJECTIONS: R C-2-3-4 Cervical facet blocks " Dr. Urbina 2/5/19, helped for several months but caused some some vertigo temp.   EMG/NCS: Dr. Son 4/26/11: Mild R CTS     IMAGING:     Cervical MRI 1/30/2023: Multilevel degenerative disc disease and facet arthrosis without significant spinal canal stenosis. There is moderate neuroforaminal narrowing at C6/C7, slightly increased degenerative changes since MRI from 2017.     Cervical spine x-ray 2/16/2017: Mild degenerative changes in several levels. There is straightening of the upper cervical curvature.     FUNCTIONAL HISTORY: The patient is independent in all ADLs, mobility, and driving. The patient does not use any assistive device.     SH:  Lives in: [place]  Lives with:   Occupation: Phlebotomist  Tobacco: No  Alcohol: Social, weekends  Drugs: No     _________________________  ROS: The patient denies any bowel or bladder incontinence/accidents, night sweats, fevers, chills, recent significant weight loss. A 14 point review of systems was reviewed with the patient and is as above and otherwise negative. ROS questionnaire negative today      Physical Exam  GEN - Alert, well-developed, well-nourished, no acute distress  PSYCH - Cooperative, appropriate mood and affect  HEENT - NC/AT  RESP - Non-labored respirations, equal expansion  CV - warm and well-perfused, No cyanosis or edema in extremities.   ABD- soft, ND  SKIN - No rash.     Tenderness in bilateral scalene and SCM muscles     Previous NECK/EXTR- Cervical ROM is full with forward flexion, extension, rotation, but limited in sidebending right worse than left, with provocation of discomfort but no significant pain. There was discomfort with facet loading bilaterally.. Spurlings and Lhermitte's negative. No tenderness of the cervical spinous processes. No tenderness of the cervical paraspinal muscles and trapezei musculature but there was tenderness of bilateral scalene, SCM muscles.      Previous NEURO - UE strength 5/5 - including shoulder  abduction, biceps, triceps (except 5 -/5 on the right), wrist extensors, finger flexors, interossei, and    Sensation - intact to light touch in bilateral upper extremities.   Reflexes - 2+ biceps, brachioradialis, triceps, reflexes bilaterally, Staley's negative bilaterally  GAIT - Normal base, normal stride length, non-antalgic. Able to toe walk and heel walk without difficulty.

## 2024-03-07 NOTE — PATIENT INSTRUCTIONS
-Try gabapentin at night, up to 600 mg  if needed  -Try robaxin, up to 4 times per day if needed  -Continue Flexeril as needed   -consider other medications in the future including medrol dose pack  -Consider switching to botox or xeomin.  -Continue daily home exercises and strength training  -Consider referral for facet block/RFA versus cervical MIMI  -Consider repeat EMG in the future  -Follow-up as needed for repeat trigger point injections, message me on [employer]

## 2024-03-08 ENCOUNTER — OFFICE VISIT (OUTPATIENT)
Dept: PHYSICAL MEDICINE AND REHAB | Facility: CLINIC | Age: 59
End: 2024-03-08
Payer: COMMERCIAL

## 2024-03-08 VITALS
SYSTOLIC BLOOD PRESSURE: 112 MMHG | HEART RATE: 71 BPM | OXYGEN SATURATION: 99 % | WEIGHT: 151 LBS | HEIGHT: 65 IN | DIASTOLIC BLOOD PRESSURE: 67 MMHG | TEMPERATURE: 97.8 F | BODY MASS INDEX: 25.16 KG/M2

## 2024-03-08 DIAGNOSIS — M47.812 CERVICAL SPONDYLOSIS WITHOUT MYELOPATHY: ICD-10-CM

## 2024-03-08 DIAGNOSIS — M50.30 DEGENERATION OF CERVICAL INTERVERTEBRAL DISC: ICD-10-CM

## 2024-03-08 DIAGNOSIS — M54.12 CERVICAL RADICULOPATHY: ICD-10-CM

## 2024-03-08 DIAGNOSIS — M79.18 MYOFASCIAL PAIN: Primary | ICD-10-CM

## 2024-03-08 DIAGNOSIS — G24.3 CERVICAL DYSTONIA: ICD-10-CM

## 2024-03-08 PROCEDURE — 1036F TOBACCO NON-USER: CPT | Performed by: PHYSICAL MEDICINE & REHABILITATION

## 2024-03-08 PROCEDURE — 99213 OFFICE O/P EST LOW 20 MIN: CPT | Performed by: PHYSICAL MEDICINE & REHABILITATION

## 2024-03-08 ASSESSMENT — PAIN SCALES - GENERAL: PAINLEVEL: 3

## 2024-03-20 ENCOUNTER — TELEPHONE (OUTPATIENT)
Dept: PHYSICAL MEDICINE AND REHAB | Facility: CLINIC | Age: 59
End: 2024-03-20
Payer: COMMERCIAL

## 2024-03-20 NOTE — TELEPHONE ENCOUNTER
----- Message from Flor Lu MD sent at 3/19/2024  5:30 PM EDT -----  Regarding: FW: Trigger point injections  Contact: [phone]  Pls help her make an apt within next few days. thanks  ----- Message -----  From: Christen Guerra  Sent: 3/19/2024   5:28 PM EDT  To: [id number] Clinical Support Staff  Subject: Trigger point injections                         Can you squeeze me in any time soon for trigger point injections for my neck?

## 2024-03-21 NOTE — PROGRESS NOTES
Provider Impressions     This is a pleasant 58-year-old right-handed woman with past medical history significant for anxiety, hypothyroidism, who presents for follow-up of neck pain. Physical exam is notable for slight triceps weakness on the right, we will monitor closely. Symptoms and physical exam findings consistent with cervical spondylosis and reactive myofascial pain/tension, C7 radiculopathy.     -Bilateral upper back and neck trigger point injections performed as above.  There were no complications and she tolerated the procedure well.  She was provided with postprocedure instructions.  -Try gabapentin at night, up to 600 mg  if needed  -Try robaxin, up to 4 times per day if needed  -Continue Flexeril as needed   -consider other medications in the future including medrol dose pack  -Consider switching to botox or xeomin.  -Continue daily home exercises and strength training  -Consider referral for facet block/RFA versus cervical MIMI  -Consider repeat EMG in the future  -Follow-up as needed for repeat trigger point injections, message me on [employer]        The patient expressed understanding and agreement with the assessment and plan. Patient encouraged to contact us should they have any questions, concerns, or any changes in symptoms.      Thank you for allowing me to participate in the care of your patient.        ** Dictated with voice recognition software, please forgive any errors in grammar and/or spelling **         Chief Complaint     Neck pain follow-up      History of Present Illness    This is a pleasant 58-year-old right-handed woman with past medical history significant for anxiety, hypothyroidism, who presents for follow-up neck pain.     She was last seen here recently on 3/8/2024, at which point she did not feel the need for any trigger point injections, previously in January they helped about 75%, still lasting but still talking ibuprofen 3-4 times per week.    I advised her to try gabapentin  up to 600 mg at night and to continue Flexeril as needed and to continue daily exercises.      Since last visit, the pain flared up a couple days ago and she would like repeat trigger point injections. Not back to square one one just yet.     She rates her pain as a 3-4/10, previously  3/10.      Otherwise, there have been no changes to her medications or past medical history since last visit.     ________________________  2/24/2023: Bilateral neck trigger point injections, try patches, consider other medications, consider other injections, consider Botox  3/24/2023: : Bilateral neck trigger point injections, try patches, consider other medications, consider other injections, consider Botox   5/24/2023: Bilateral neck xeomin injections,consider other medications, consider other injections, continue HEP  6/20/2023: We will increase Xeomin slightly next time, consider other injections and medications, continue HEP  7/18/23: Bilateral neck xeomin injections,consider other medications, consider other injections, continue HEP, slightly more xeomin next time in some of the muscles  8/25/2023: Bilateral neck trigger point injections, continue medications, consider other injections, continue exercises  12/28/2023:Bilateral neck Xeomin injections, continue medications, consider other injections, continue exercises  1/25/2024:Bilateral upper back and neck trigger point injections, trial of gabapentin, try Robaxin, consider other medications, consider referral for deeper neck injections, consider Botox versus Dysport , Continue exercises  3/8/2024:trial of gabapentin, try Robaxin, consider other medications, consider referral for deeper neck injections, consider Botox versus Dysport , Continue exercises, follow up as needed  3/22/2024: Bilateral upper back and neck trigger point injections, trial of gabapentin, try Robaxin, consider other medications, consider referral for deeper neck injections, consider Botox versus Dysport  ", Continue exercises, follow up as needed  ______________________  As a reminder:     TIMELINE OF COMPLAINT(S):     She has been having neck pain on and off for the past 25 to 30 years. There was no inciting or traumatic event, but in her early 30s, she was knocked over by a sled rider and fell onto her head. She remembers starting to have neck issues after that. She said \"that is how I got my straight neck\". For the past 5 years, things have been getting worse slowly and she is now finding herself taking ibuprofen every other day and Flexeril a couple times a week at night which helps. The pain can radiate into her right temple at times and be associated with nausea as well.     It has been affecting her day-to-day life, work life, and sleep. Her primary care physician sent her here to consider trigger point injections.     Of note, she had right carpal tunnel release with Dr. Rios in 2011, helped.     Pain:  LOCATION-bilateral lateral neck R>L  RADIATION- Trapz feel tight  ASSOCIATED WITH- nausea, no falls  NUMBNESS/TINGLING-sometimes both hands feel tingling in the morning for the past 6 months  WEAKNESS-no  CONSTANT or INTERMITTENT-intermittent, but can wake up at night from it  SEVERITY/QUANTITY- 4/10, 7/10 at its worst, last time a couple of weeks ago  QUALITY- achy, pressure, gnawing  EXACERBATED BY- sometimes worse in the mornings, time  BETTER WITH- ibuprofen, heat, flexeril  TRIED-   Anti-Inflammatories: ibuprofen helps, Medrol Dosepak (not for this), previously meloxicam (doesn't remember) Celebrex (increases appetite)   Muscle relaxants: Flexeril helps   Anti-depressants:   Neuroleptics:   LDN:     PHYSICAL THERAPY: Several years ago, helped a little, still does HEP several times a week, does weighs at the gym on her own.  TENS unit: Yes, hasn't helped for neck. Helped for \"sciatica\"  CHIROPRACTIC MANIPULATION: Yes, temp relief  ACUPUNCTURE TREATMENTS: Yes, temp relief  DEEP TISSUE MASSAGE THERAPY: " Yes, temp relief  OSTEOPATHIC MANIPULATION THERAPY: yes, Dr. Jean helps but wears off  INJECTIONS: R C-2-3-4 Cervical facet blocks Dr. Urbina 2/5/19, helped for several months but caused some some vertigo temp.   EMG/NCS: Dr. Son 4/26/11: Mild R CTS     IMAGING:     Cervical MRI 1/30/2023: Multilevel degenerative disc disease and facet arthrosis without significant spinal canal stenosis. There is moderate neuroforaminal narrowing at C6/C7, slightly increased degenerative changes since MRI from 2017.     Cervical spine x-ray 2/16/2017: Mild degenerative changes in several levels. There is straightening of the upper cervical curvature.     FUNCTIONAL HISTORY: The patient is independent in all ADLs, mobility, and driving. The patient does not use any assistive device.     SH:  Lives in: [place]  Lives with:   Occupation: Phlebotomist  Tobacco: No  Alcohol: Social, weekends  Drugs: No     _________________________  ROS: The patient denies any bowel or bladder incontinence/accidents, night sweats, fevers, chills, recent significant weight loss. A 14 point review of systems was reviewed with the patient and is as above and otherwise negative. ROS questionnaire positive for sleep disturbances      Physical Exam  GEN - Alert, well-developed, well-nourished, no acute distress  PSYCH - Cooperative, appropriate mood and affect  HEENT - NC/AT  RESP - Non-labored respirations, equal expansion  CV - warm and well-perfused, No cyanosis or edema in extremities.   ABD- soft, ND  SKIN - No rash.     Tenderness in bilateral scalene and SCM muscles     Previous NECK/EXTR- Cervical ROM is full with forward flexion, extension, rotation, but limited in sidebending right worse than left, with provocation of discomfort but no significant pain. There was discomfort with facet loading bilaterally.. Spurlings and Lhermitte's negative. No tenderness of the cervical spinous processes. No tenderness of the cervical paraspinal muscles  and trapezei musculature but there was tenderness of bilateral scalene, SCM muscles.      Previous NEURO - UE strength 5/5 - including shoulder abduction, biceps, triceps (except 5 -/5 on the right), wrist extensors, finger flexors, interossei, and    Sensation - intact to light touch in bilateral upper extremities.   Reflexes - 2+ biceps, brachioradialis, triceps, reflexes bilaterally, Staley's negative bilaterally  GAIT - Normal base, normal stride length, non-antalgic. Able to toe walk and heel walk without difficulty.     PROCEDURE:    Lidocaine 1%- 5 cc's used, 0 cc's wasted  200mg/20mL (10mg/mL)  NDC 3919-2487-08  LOT [id number]  EXP 03/01/2025  Manuf: [facility]    Cervical and Thoracic trigger point injections:      Description of the Procedure: The procedure, risks and alternative treatments were discussed with the patient. After written informed consent was obtained, the trigger points in the cervical paraspinal levator scapulae, upper trapezius, scalene muscles were palpated and marked. The skin was prepped three times with alcohol. Using a 27 gauge 1.5 inch needle, after negative aspiration, the trigger points in each muscle were injected with a total of 5 cc's of 1% lidocaine, spread equally into 5 sites. Twitch responses were observed in the musculature. The patient tolerated the procedure well with no immediate complications or bleeding.      Plan:   1. The patient was instructed in post-procedural care.  2. The patient was asked to apply moist heat and or ice for the next 24 hours and to perform daily gentle stretching exercises.   Physical Exam  HENT:      Head:

## 2024-03-21 NOTE — PATIENT INSTRUCTIONS
-Ice on and off for the next 24 hours if injection sites are sore. Do gentle range of motion exercises in each area that was injected. Try to do them every hour for about half a minute or so, in every direction that the affected part goes. No pool, bath, or hot tub today. Avoid heavy lifting for the next 2 days.    -Try gabapentin at night, up to 600 mg  if needed  -Try robaxin, up to 4 times per day if needed  -Continue Flexeril as needed   -consider other medications in the future including medrol dose pack  -Consider switching to botox or xeomin.  -Continue daily home exercises and strength training  -Consider referral for facet block/RFA versus cervical MIMI  -Consider repeat EMG in the future  -Follow-up as needed for repeat trigger point injections, message me on [employer]

## 2024-03-22 ENCOUNTER — PROCEDURE VISIT (OUTPATIENT)
Dept: PHYSICAL MEDICINE AND REHAB | Facility: CLINIC | Age: 59
End: 2024-03-22
Payer: COMMERCIAL

## 2024-03-22 VITALS
HEIGHT: 65 IN | WEIGHT: 151 LBS | DIASTOLIC BLOOD PRESSURE: 68 MMHG | BODY MASS INDEX: 25.16 KG/M2 | HEART RATE: 75 BPM | SYSTOLIC BLOOD PRESSURE: 130 MMHG | TEMPERATURE: 97.2 F | OXYGEN SATURATION: 99 %

## 2024-03-22 DIAGNOSIS — M54.12 CERVICAL RADICULOPATHY: ICD-10-CM

## 2024-03-22 DIAGNOSIS — M47.812 CERVICAL SPONDYLOSIS WITHOUT MYELOPATHY: ICD-10-CM

## 2024-03-22 DIAGNOSIS — M79.18 MYOFASCIAL PAIN: Primary | ICD-10-CM

## 2024-03-22 DIAGNOSIS — G24.3 CERVICAL DYSTONIA: ICD-10-CM

## 2024-03-22 DIAGNOSIS — M50.30 DEGENERATION OF CERVICAL INTERVERTEBRAL DISC: ICD-10-CM

## 2024-03-22 PROCEDURE — 20552 NJX 1/MLT TRIGGER POINT 1/2: CPT | Performed by: PHYSICAL MEDICINE & REHABILITATION

## 2024-03-22 ASSESSMENT — PAIN SCALES - GENERAL: PAINLEVEL: 4

## 2024-03-28 ENCOUNTER — SPECIALTY PHARMACY (OUTPATIENT)
Dept: PHARMACY | Facility: CLINIC | Age: 59
End: 2024-03-28

## 2024-03-28 DIAGNOSIS — F41.9 ANXIETY: Primary | ICD-10-CM

## 2024-03-28 PROCEDURE — RXMED WILLOW AMBULATORY MEDICATION CHARGE

## 2024-03-28 RX ORDER — SERTRALINE HYDROCHLORIDE 25 MG/1
TABLET, FILM COATED ORAL
Qty: 360 TABLET | Refills: 3 | Status: SHIPPED | OUTPATIENT
Start: 2024-03-28 | End: 2025-03-28

## 2024-03-29 ENCOUNTER — PHARMACY VISIT (OUTPATIENT)
Dept: PHARMACY | Facility: CLINIC | Age: 59
End: 2024-03-29
Payer: COMMERCIAL

## 2024-04-07 DIAGNOSIS — E03.9 HYPOTHYROIDISM, UNSPECIFIED TYPE: Primary | ICD-10-CM

## 2024-04-08 PROCEDURE — RXMED WILLOW AMBULATORY MEDICATION CHARGE

## 2024-04-08 RX ORDER — LEVOTHYROXINE SODIUM 25 UG/1
25 TABLET ORAL DAILY
Qty: 90 TABLET | Refills: 3 | Status: SHIPPED | OUTPATIENT
Start: 2024-04-08 | End: 2024-05-13 | Stop reason: WASHOUT

## 2024-04-10 ENCOUNTER — PHARMACY VISIT (OUTPATIENT)
Dept: PHARMACY | Facility: CLINIC | Age: 59
End: 2024-04-10
Payer: COMMERCIAL

## 2024-04-25 ENCOUNTER — SPECIALTY PHARMACY (OUTPATIENT)
Dept: PHARMACY | Facility: CLINIC | Age: 59
End: 2024-04-25

## 2024-05-13 ENCOUNTER — PROCEDURE VISIT (OUTPATIENT)
Dept: PRIMARY CARE | Facility: CLINIC | Age: 59
End: 2024-05-13
Payer: COMMERCIAL

## 2024-05-13 VITALS
WEIGHT: 151 LBS | BODY MASS INDEX: 25.13 KG/M2 | OXYGEN SATURATION: 97 % | SYSTOLIC BLOOD PRESSURE: 124 MMHG | HEART RATE: 73 BPM | DIASTOLIC BLOOD PRESSURE: 82 MMHG

## 2024-05-13 DIAGNOSIS — M99.00 SOMATIC DYSFUNCTION OF HEAD REGION: ICD-10-CM

## 2024-05-13 DIAGNOSIS — M99.01 SOMATIC DYSFUNCTION OF CERVICAL REGION: ICD-10-CM

## 2024-05-13 DIAGNOSIS — M99.08 SOMATIC DYSFUNCTION OF RIB: ICD-10-CM

## 2024-05-13 DIAGNOSIS — M47.812 CERVICAL SPONDYLOSIS WITHOUT MYELOPATHY: ICD-10-CM

## 2024-05-13 DIAGNOSIS — M54.12 CERVICAL RADICULOPATHY: Primary | ICD-10-CM

## 2024-05-13 PROCEDURE — 98926 OSTEOPATH MANJ 3-4 REGIONS: CPT | Performed by: FAMILY MEDICINE

## 2024-05-13 PROCEDURE — 99213 OFFICE O/P EST LOW 20 MIN: CPT | Performed by: FAMILY MEDICINE

## 2024-05-13 ASSESSMENT — PATIENT HEALTH QUESTIONNAIRE - PHQ9
2. FEELING DOWN, DEPRESSED OR HOPELESS: NOT AT ALL
SUM OF ALL RESPONSES TO PHQ9 QUESTIONS 1 AND 2: 0
1. LITTLE INTEREST OR PLEASURE IN DOING THINGS: NOT AT ALL

## 2024-05-13 NOTE — PROGRESS NOTES
Subjective   Christen Meredithr is a 58 y.o. female who presents for Procedure (OMT).    HPI    #) chronic cervical pain   Mostly upper back and neck   neck pain - 4/10--> 2-3/10   - more right than left side  - upper back - 3/10  neck stared 30 jake years   sled riding injury - whiplash injury   still seeing a chiro- pat- 3 days per week for 3 months   - saw Dr miller and had botox injections, not much help , the trigger point injections seem to be helping with rotation  - still restriction in side bending  doing neck exercises and stretched and foam roller  going to the gym  associated symptoms: some headaches more on the right  - no vision or hearing changes  - no vertigo  - occasional numbness/tinging in to the delt and trap   imaging- some with chiro recently   did also see Dr Urbina for pain management  - injections helped temporarily   CT of the neck several years ago     #) palpitation- stable  - had COVID in 2022 took a while to recover  - occasionally wake from sleep - lousy sleep  - not much snoring, cyclical sleep patterns, maybe feels hormonal   - increased stress at home and at work   - sleep not great, gets up around 2-3 am every am and then can fall back to sleep for 1+ hours.   - exercise 5 days per week   - no dizziness  - no chest pains, no cough  - slight wheezing sensation  - chest heaviness associated with the palpitations   - no arrhythmia in the family    #) Anxiety with insomnia - still having break through symptoms  - on sertraline 25 mg 4 times per day   - will trial the propranolol at bedtime.  - diff falling and staying asleep     #) low thyroid- recheck TSH now   - on levothyroxine 25 mcg     #) Preventative   PAP 5/30/19 ASCUS/ HPV NEG  MAMMO 10/12/2021  DEXA NEVER  COLON 5-14-18 ( 10 YEARS )     ROS was completed and all systems are negative with the exception of what was noted in the the HPI.     Objective     /82   Pulse 73   Wt 68.5 kg (151 lb)   SpO2 97%   BMI 25.13  "kg/m²      Physical Exam  GEN: A+O, no acute distress  HEENT: NC/AT, Oropharynx clear, no exudates, TM visualized, Extraoccular muscles intact, no facial droop; no thyromegaly or cervical LAD  RESP: CTAB, no wheezes   CV: RRR, no murmurs  ABD: soft, non-tender, + BS  SKIN: no rashes or bruising, no peripheral edema   NEURO: CN II-XII grossly intact, moves all extremities equally, no tremor   PSYCH: normal affect, appropriate mood     OA rotated right   C3-4 RrSl  C5-6 RlSr  Elevated 1st rib on the right     Assessment/Plan     Osteopathic Manipulative Treatment was performed today. You may experience some increased soreness and tenderness at the treatment sites. Please increase fluids and consider taking a warm shower later today.      Please continue to engage in \"relative rest\" doing as much exercises / stretching / Physical activities as possible without making your pain or other symptoms worse.      Please continue the TENS unit as needed.      You may wish to use Ice and/or moist heat for 20 minutes up to 4 times per day.   As well as using Medications such as the following for moderate to severe pain:            Acetaminophen 650 mg 3X /Day           Ibuprofen 800 mg q 8 hrs as needed with food    Please continue the magnesium glycinate 400 mg at bedtime    Please also continue the sertraline 100 mg daily.     Please continue the propranolol at bedtime for the palpations, anxiety and insomnia.     Return to Office / Musculoskeletal Clinic:   _____ Days _2-4___ Wks ____ Mo ____ prn              Chely Jean DO, [facility] [place] [place]  [address]   [address]   [place] [id number]  Ph. [phone]  Fx. [phone]  "

## 2024-05-13 NOTE — PATIENT INSTRUCTIONS
"Osteopathic Manipulative Treatment was performed today. You may experience some increased soreness and tenderness at the treatment sites. Please increase fluids and consider taking a warm shower later today.      Please continue to engage in \"relative rest\" doing as much exercises / stretching / Physical activities as possible without making your pain or other symptoms worse.      Please continue the TENS unit as needed.      You may wish to use Ice and/or moist heat for 20 minutes up to 4 times per day.   As well as using Medications such as the following for moderate to severe pain:            Acetaminophen 650 mg 3X /Day           Ibuprofen 800 mg q 8 hrs as needed with food    Please continue the magnesium glycinate 400 mg at bedtime    Please also continue the sertraline 100 mg daily.     Please continue the propranolol at bedtime for the palpations, anxiety and insomnia.     Return to Office / Musculoskeletal Clinic:   _____ Days _2-4___ Wks ____ Mo ____ prn            "

## 2024-06-17 NOTE — PROGRESS NOTES
Provider Impressions     This is a pleasant 58-year-old right-handed woman with past medical history significant for anxiety, hypothyroidism, who presents for follow-up of neck pain. Physical exam is notable for slight triceps weakness on the right, we will monitor closely. Symptoms and physical exam findings consistent with cervical spondylosis and reactive myofascial pain/tension, C7 radiculopathy.     -Bilateral upper back and neck trigger point injections performed as above.  There were no complications and she tolerated the procedure well.  She was provided with postprocedure instructions.  -Consider gabapentin regularly  -Continue Flexeril as needed   -consider other medications in the future including medrol dose pack  -Consider switching to botox or dysport  -Continue daily home exercises and strength training  -Consider referral for facet block/RFA versus cervical MIMI  -Consider repeat EMG in the future  -Follow-up as needed for repeat trigger point injections, message me on mychart           The patient expressed understanding and agreement with the assessment and plan. Patient encouraged to contact us should they have any questions, concerns, or any changes in symptoms.      Thank you for allowing me to participate in the care of your patient.        ** Dictated with voice recognition software, please forgive any errors in grammar and/or spelling **         Chief Complaint     Neck pain follow-up      History of Present Illness    This is a pleasant 58-year-old right-handed woman with past medical history significant for anxiety, hypothyroidism, who presents for follow-up neck pain.     She was last seen here on 3/22/2024, at which point I did repeat upper back and neck trigger point injections bilaterally.  Previously they helped about 75%, and this time 80% for about 10 weeks.     I advised her to try gabapentin up to 600 mg at night and to continue Flexeril as needed and to continue daily exercises.   Flexeril helps together with ibuprofen, rarely needs it. Robaxin didn't help as much. She tried the gabapentin only twice and it didn't help.    She had an OMT session with her PCP Dr. Jean on 5/13/2024, note personally reviewed today. This helped.    She rates her pain as a 4/10, previously  3-4/10.      Otherwise, there have been no changes to her medications or past medical history since last visit.     ________________________  2/24/2023: Bilateral neck trigger point injections, try patches, consider other medications, consider other injections, consider Botox  3/24/2023: : Bilateral neck trigger point injections, try patches, consider other medications, consider other injections, consider Botox   5/24/2023: Bilateral neck xeomin injections,consider other medications, consider other injections, continue HEP  6/20/2023: We will increase Xeomin slightly next time, consider other injections and medications, continue HEP  7/18/23: Bilateral neck xeomin injections,consider other medications, consider other injections, continue HEP, slightly more xeomin next time in some of the muscles  8/25/2023: Bilateral neck trigger point injections, continue medications, consider other injections, continue exercises  12/28/2023:Bilateral neck Xeomin injections, continue medications, consider other injections, continue exercises  1/25/2024:Bilateral upper back and neck trigger point injections, Consider gabapentin, consider other medications, consider referral for deeper neck injections, consider Botox versus Dysport , Continue exercises  3/8/2024:trial of gabapentin, try Robaxin, consider other medications, consider referral for deeper neck injections, consider Botox versus Dysport , Continue exercises, follow up as needed  3/22/2024: Bilateral upper back and neck trigger point injections, trial of gabapentin, try Robaxin, consider other medications, consider referral for deeper neck injections, consider Botox versus Dysport ,  "Continue exercises, follow up as needed  6/18/2024: Bilateral upper back and neck trigger point injections, trial of gabapentin, consider other medications, consider referral for deeper neck injections, consider Botox versus Dysport , Continue exercises, follow up as needed  ______________________  As a reminder:     TIMELINE OF COMPLAINT(S):     She has been having neck pain on and off for the past 25 to 30 years. There was no inciting or traumatic event, but in her early 30s, she was knocked over by a sled rider and fell onto her head. She remembers starting to have neck issues after that. She said \"that is how I got my straight neck\". For the past 5 years, things have been getting worse slowly and she is now finding herself taking ibuprofen every other day and Flexeril a couple times a week at night which helps. The pain can radiate into her right temple at times and be associated with nausea as well.     It has been affecting her day-to-day life, work life, and sleep. Her primary care physician sent her here to consider trigger point injections.     Of note, she had right carpal tunnel release with Dr. Rios in 2011, helped.     Pain:  LOCATION-bilateral lateral neck R>L  RADIATION- Trapz feel tight  ASSOCIATED WITH- nausea, no falls  NUMBNESS/TINGLING-sometimes both hands feel tingling in the morning for the past 6 months  WEAKNESS-no  CONSTANT or INTERMITTENT-intermittent, but can wake up at night from it  SEVERITY/QUANTITY- 4/10, 7/10 at its worst, last time a couple of weeks ago  QUALITY- achy, pressure, gnawing  EXACERBATED BY- sometimes worse in the mornings, time  BETTER WITH- ibuprofen, heat, flexeril  TRIED-   Anti-Inflammatories: ibuprofen helps, Medrol Dosepak (not for this), previously meloxicam (doesn't remember) Celebrex (increases appetite)   Muscle relaxants: Flexeril helps   Anti-depressants:   Neuroleptics:   LDN:     PHYSICAL THERAPY: Several years ago, helped a little, still does HEP several " "times a week, does weighs at the gym on her own.  TENS unit: Yes, hasn't helped for neck. Helped for \"sciatica\"  CHIROPRACTIC MANIPULATION: Yes, temp relief  ACUPUNCTURE TREATMENTS: Yes, temp relief  DEEP TISSUE MASSAGE THERAPY: Yes, temp relief  OSTEOPATHIC MANIPULATION THERAPY: yes, Dr. Jean helps but wears off  INJECTIONS: R C-2-3-4 Cervical facet blocks Dr. Urbina 2/5/19, helped for several months but caused some some vertigo temp.   EMG/NCS: Dr. Son 4/26/11: Mild R CTS     IMAGING:     Cervical MRI 1/30/2023: Multilevel degenerative disc disease and facet arthrosis without significant spinal canal stenosis. There is moderate neuroforaminal narrowing at C6/C7, slightly increased degenerative changes since MRI from 2017.     Cervical spine x-ray 2/16/2017: Mild degenerative changes in several levels. There is straightening of the upper cervical curvature.     FUNCTIONAL HISTORY: The patient is independent in all ADLs, mobility, and driving. The patient does not use any assistive device.     SH:  Lives in: [place]  Lives with:   Occupation: Phlebotomist  Tobacco: No  Alcohol: Social, weekends  Drugs: No     _________________________  ROS: The patient denies any bowel or bladder incontinence/accidents, night sweats, fevers, chills, recent significant weight loss. A 14 point review of systems was reviewed with the patient and is as above and otherwise negative. ROS questionnaire positive for muscle pain/tightness, limited range of motion      Physical Exam  GEN - Alert, well-developed, well-nourished, no acute distress  PSYCH - Cooperative, appropriate mood and affect  HEENT - NC/AT  RESP - Non-labored respirations, equal expansion  CV - warm and well-perfused, No cyanosis or edema in extremities.   ABD- soft, ND  SKIN - No rash.     Tenderness in bilateral scalene and SCM muscles     Previous NECK/EXTR- Cervical ROM is full with forward flexion, extension, rotation, but limited in sidebending right " worse than left, with provocation of discomfort but no significant pain. There was discomfort with facet loading bilaterally.. Spurlings and Lhermitte's negative. No tenderness of the cervical spinous processes. No tenderness of the cervical paraspinal muscles and trapezei musculature but there was tenderness of bilateral scalene, SCM muscles.      Previous NEURO - UE strength 5/5 - including shoulder abduction, biceps, triceps (except 5 -/5 on the right), wrist extensors, finger flexors, interossei, and    Sensation - intact to light touch in bilateral upper extremities.   Reflexes - 2+ biceps, brachioradialis, triceps, reflexes bilaterally, Staley's negative bilaterally  GAIT - Normal base, normal stride length, non-antalgic. Able to toe walk and heel walk without difficulty.     PROCEDURE:    Lidocaine 1%- 8 cc's used, 0 cc's wasted  200mg/20mL (10mg/mL)  NDC 7220-9727-74  LOT [id number]  EXP 02/01/2025  Manuf: [facility]    Cervical and Thoracic trigger point injections:      Description of the Procedure: The procedure, risks and alternative treatments were discussed with the patient. After written informed consent was obtained, the trigger points in the cervical paraspinal levator scapulae, upper trapezius, scalene muscles were palpated and marked. The skin was prepped three times with alcohol. Using a 27 gauge 1.5 inch needle, after negative aspiration, the trigger points in each muscle were injected with a total of 8 cc's of 1% lidocaine, spread equally into 7 sites. Twitch responses were observed in the musculature. The patient tolerated the procedure well with no immediate complications or bleeding.      Plan:   1. The patient was instructed in post-procedural care.  2. The patient was asked to apply moist heat and or ice for the next 24 hours and to perform daily gentle stretching exercises.   Physical Exam  HENT:      Head:

## 2024-06-17 NOTE — PATIENT INSTRUCTIONS
-Ice on and off for the next 24 hours if injection sites are sore. Do gentle range of motion exercises in each area that was injected. Try to do them every hour for about half a minute or so, in every direction that the affected part goes. No pool, bath, or hot tub today. Avoid heavy lifting for the next 2 days.    -Consider gabapentin regularly  -Continue Flexeril as needed   -consider other medications in the future including medrol dose pack  -Consider switching to botox or dysport  -Continue daily home exercises and strength training  -Consider referral for facet block/RFA versus cervical MIMI  -Consider repeat EMG in the future  -Follow-up as needed for repeat trigger point injections, message me on [facility]

## 2024-06-18 ENCOUNTER — APPOINTMENT (OUTPATIENT)
Dept: PHYSICAL MEDICINE AND REHAB | Facility: CLINIC | Age: 59
End: 2024-06-18
Payer: COMMERCIAL

## 2024-06-18 VITALS
RESPIRATION RATE: 18 BRPM | HEART RATE: 69 BPM | BODY MASS INDEX: 25.46 KG/M2 | WEIGHT: 152.8 LBS | TEMPERATURE: 97.8 F | SYSTOLIC BLOOD PRESSURE: 125 MMHG | DIASTOLIC BLOOD PRESSURE: 76 MMHG | HEIGHT: 65 IN

## 2024-06-18 DIAGNOSIS — M50.30 DEGENERATION OF CERVICAL INTERVERTEBRAL DISC: ICD-10-CM

## 2024-06-18 DIAGNOSIS — M47.812 CERVICAL SPONDYLOSIS WITHOUT MYELOPATHY: ICD-10-CM

## 2024-06-18 DIAGNOSIS — M54.12 CERVICAL RADICULOPATHY: ICD-10-CM

## 2024-06-18 DIAGNOSIS — G24.3 CERVICAL DYSTONIA: ICD-10-CM

## 2024-06-18 DIAGNOSIS — M79.18 MYOFASCIAL PAIN: Primary | ICD-10-CM

## 2024-06-18 PROCEDURE — 1036F TOBACCO NON-USER: CPT | Performed by: PHYSICAL MEDICINE & REHABILITATION

## 2024-06-18 PROCEDURE — 20553 NJX 1/MLT TRIGGER POINTS 3/>: CPT | Performed by: PHYSICAL MEDICINE & REHABILITATION

## 2024-06-18 RX ORDER — CYCLOBENZAPRINE HCL 5 MG
5 TABLET ORAL 3 TIMES DAILY PRN
Qty: 90 TABLET | Refills: 2 | Status: SHIPPED | OUTPATIENT
Start: 2024-06-18 | End: 2024-09-16

## 2024-06-18 ASSESSMENT — PAIN SCALES - GENERAL: PAINLEVEL: 5

## 2024-06-28 PROCEDURE — RXMED WILLOW AMBULATORY MEDICATION CHARGE

## 2024-07-02 DIAGNOSIS — E03.9 HYPOTHYROIDISM, UNSPECIFIED TYPE: ICD-10-CM

## 2024-07-03 ENCOUNTER — PHARMACY VISIT (OUTPATIENT)
Dept: PHARMACY | Facility: CLINIC | Age: 59
End: 2024-07-03
Payer: COMMERCIAL

## 2024-07-05 PROCEDURE — RXMED WILLOW AMBULATORY MEDICATION CHARGE

## 2024-07-05 RX ORDER — LEVOTHYROXINE SODIUM 25 UG/1
25 TABLET ORAL DAILY
Qty: 90 TABLET | Refills: 3 | Status: SHIPPED | OUTPATIENT
Start: 2024-07-05 | End: 2025-07-05

## 2024-07-10 ENCOUNTER — PHARMACY VISIT (OUTPATIENT)
Dept: PHARMACY | Facility: CLINIC | Age: 59
End: 2024-07-10
Payer: COMMERCIAL

## 2024-07-26 ENCOUNTER — APPOINTMENT (OUTPATIENT)
Dept: OBSTETRICS AND GYNECOLOGY | Facility: CLINIC | Age: 59
End: 2024-07-26
Payer: COMMERCIAL

## 2024-07-26 VITALS
WEIGHT: 153 LBS | DIASTOLIC BLOOD PRESSURE: 78 MMHG | SYSTOLIC BLOOD PRESSURE: 128 MMHG | BODY MASS INDEX: 25.49 KG/M2 | HEIGHT: 65 IN

## 2024-07-26 DIAGNOSIS — Z01.419 WELL WOMAN EXAM WITH ROUTINE GYNECOLOGICAL EXAM: Primary | ICD-10-CM

## 2024-07-26 DIAGNOSIS — Z12.4 ROUTINE CERVICAL SMEAR: ICD-10-CM

## 2024-07-26 PROBLEM — F41.9 ANXIETY: Status: ACTIVE | Noted: 2024-07-26

## 2024-07-26 PROBLEM — E03.9 HYPOTHYROIDISM: Status: ACTIVE | Noted: 2024-07-26

## 2024-07-26 PROCEDURE — 1036F TOBACCO NON-USER: CPT | Performed by: NURSE PRACTITIONER

## 2024-07-26 PROCEDURE — 3008F BODY MASS INDEX DOCD: CPT | Performed by: NURSE PRACTITIONER

## 2024-07-26 PROCEDURE — 87624 HPV HI-RISK TYP POOLED RSLT: CPT

## 2024-07-26 PROCEDURE — 99396 PREV VISIT EST AGE 40-64: CPT | Performed by: NURSE PRACTITIONER

## 2024-07-26 RX ORDER — SERTRALINE HYDROCHLORIDE 100 MG/1
100 TABLET, FILM COATED ORAL DAILY
COMMUNITY
End: 2024-07-26 | Stop reason: SDUPTHER

## 2024-07-26 ASSESSMENT — ENCOUNTER SYMPTOMS
ENDOCRINE NEGATIVE: 1
RESPIRATORY NEGATIVE: 1
CARDIOVASCULAR NEGATIVE: 1
ALLERGIC/IMMUNOLOGIC NEGATIVE: 1
NEUROLOGICAL NEGATIVE: 1
CONSTITUTIONAL NEGATIVE: 1
PSYCHIATRIC NEGATIVE: 1
HEMATOLOGIC/LYMPHATIC NEGATIVE: 1
GASTROINTESTINAL NEGATIVE: 1
EYES NEGATIVE: 1
MUSCULOSKELETAL NEGATIVE: 1

## 2024-07-26 NOTE — PROGRESS NOTES
"Chief Complaint    Annual Exam        HPI    PAP 21 NEG HPV NEG  MAMMO 24  DEXA NEVER  COLON 18    CHAPERONE: DECLINED  Last edited by Joel Sotelo MA on 2024 11:37 AM.         58 y.o.  female presents for annual well woman exam.   H/O endometrial ablation in her late 30's.   Denies significant menopausal symptoms. She is not on hormone replacement therapy.  Denies any post-menopausal vaginal bleeding.  She is , not currently sexually active. She has had issues with vaginal dryness, dyspareunia in the past. Had used vaginal estrogen, but not using currently. Declines need for refills at this time.   She denies any breast changes.   She has hx. of LSIL and colposcopy which showed IGNACIO 1 in . Her paps have been normal since. Last pap: 2021 negative and negative HPV.   Denies pelvic pain.   Denies abnormal vaginal discharge, itching, odor.   No urinary or bowel concerns. Colonoscopy:  which was normal and is due to return at 10 years.   She is a non-smoker.   PMH: Anxiety, depression, hypothyroidism.  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     /78   Ht 1.651 m (5' 5\")   Wt 69.4 kg (153 lb)   BMI 25.46 kg/m²      Current Outpatient Medications   Medication Instructions    cyclobenzaprine (FLEXERIL) 5 mg, oral, 3 times daily PRN    levothyroxine (Synthroid, Levoxyl) 25 mcg tablet TAKE 1 TABLET BY MOUTH ONCE A DAY    sertraline (Zoloft) 25 mg tablet TAKE 3 TO 4 TABLETS BY MOUTH ONCE DAILY      Review of Systems   Constitutional: Negative.    HENT: Negative.     Eyes: Negative.    Respiratory: Negative.     Cardiovascular: Negative.    Gastrointestinal: Negative.    Endocrine: Negative.    Genitourinary: Negative.    Musculoskeletal: Negative.    Skin: Negative.    Allergic/Immunologic: Negative.    Neurological: Negative.    Hematological: Negative.    Psychiatric/Behavioral: " Negative.     All other systems reviewed and are negative.       Physical Exam  Constitutional:       Appearance: Normal appearance.   HENT:      Head: Normocephalic.      Nose: Nose normal.   Cardiovascular:      Rate and Rhythm: Normal rate and regular rhythm.   Pulmonary:      Effort: Pulmonary effort is normal.      Breath sounds: Normal breath sounds.   Chest:   Breasts:     Right: Normal.      Left: Normal.   Abdominal:      General: Abdomen is flat.      Palpations: Abdomen is soft.   Genitourinary:     General: Normal vulva.      Vagina: Normal.      Cervix: Normal.      Uterus: Normal.       Adnexa: Right adnexa normal and left adnexa normal.      Rectum: Normal.      Comments: Pap collected  Musculoskeletal:         General: Normal range of motion.      Cervical back: Normal range of motion and neck supple.   Skin:     General: Skin is warm and dry.   Neurological:      Mental Status: She is alert.   Psychiatric:         Mood and Affect: Mood normal.         Behavior: Behavior normal.          Assessment/Plan:  1. Well woman exam with routine gynecological exam  -Pap test w/HPV testing collected today.   -Mammogram is up to date. Self breast awareness exams reviewed.  -Colonoscopy is up to date.  -Advised yearly well woman exams.   -Follow up sooner if needed.     2. Routine cervical smear  - THINPREP PAP TEST

## 2024-08-08 LAB
CYTOLOGY CMNT CVX/VAG CYTO-IMP: NORMAL
HPV HR 12 DNA GENITAL QL NAA+PROBE: NEGATIVE
HPV HR GENOTYPES PNL CVX NAA+PROBE: NEGATIVE
HPV16 DNA SPEC QL NAA+PROBE: NEGATIVE
HPV18 DNA SPEC QL NAA+PROBE: NEGATIVE
LAB AP HPV GENOTYPE QUESTION: YES
LAB AP HPV HR: NORMAL
LABORATORY COMMENT REPORT: NORMAL
PATH REPORT.TOTAL CANCER: NORMAL

## 2024-10-07 PROCEDURE — RXMED WILLOW AMBULATORY MEDICATION CHARGE

## 2024-10-08 ENCOUNTER — PHARMACY VISIT (OUTPATIENT)
Dept: PHARMACY | Facility: CLINIC | Age: 59
End: 2024-10-08
Payer: COMMERCIAL

## 2024-10-09 ENCOUNTER — PROCEDURE VISIT (OUTPATIENT)
Dept: PHYSICAL MEDICINE AND REHAB | Facility: CLINIC | Age: 59
End: 2024-10-09
Payer: COMMERCIAL

## 2024-10-09 VITALS
OXYGEN SATURATION: 99 % | SYSTOLIC BLOOD PRESSURE: 130 MMHG | WEIGHT: 153 LBS | HEART RATE: 68 BPM | BODY MASS INDEX: 25.49 KG/M2 | HEIGHT: 65 IN | DIASTOLIC BLOOD PRESSURE: 72 MMHG | TEMPERATURE: 97.5 F

## 2024-10-09 DIAGNOSIS — M47.812 CERVICAL SPONDYLOSIS WITHOUT MYELOPATHY: ICD-10-CM

## 2024-10-09 DIAGNOSIS — G24.3 CERVICAL DYSTONIA: ICD-10-CM

## 2024-10-09 DIAGNOSIS — M79.18 MYOFASCIAL PAIN: Primary | ICD-10-CM

## 2024-10-09 DIAGNOSIS — M50.30 DEGENERATION OF CERVICAL INTERVERTEBRAL DISC: ICD-10-CM

## 2024-10-09 DIAGNOSIS — M54.12 CERVICAL RADICULOPATHY: ICD-10-CM

## 2024-10-09 PROCEDURE — 20553 NJX 1/MLT TRIGGER POINTS 3/>: CPT | Performed by: PHYSICAL MEDICINE & REHABILITATION

## 2024-10-09 ASSESSMENT — PAIN SCALES - GENERAL: PAINLEVEL: 6

## 2024-10-09 NOTE — PROGRESS NOTES
Provider Impressions     This is a pleasant 58-year-old right-handed woman with past medical history significant for anxiety, hypothyroidism, who presents for follow-up of neck pain. Physical exam is notable for slight triceps weakness on the right, we will monitor closely. Symptoms and physical exam findings consistent with cervical spondylosis and reactive myofascial pain/tension, C7 radiculopathy.     -Bilateral upper back and neck trigger point injections performed as above.  There were no complications and she tolerated the procedure well.  She was provided with postprocedure instructions.  -Consider gabapentin regularly  -Continue Flexeril as needed   -consider other medications in the future including medrol dose pack  -Consider switching to botox or dysport  -Continue daily home exercises and strength training  -Consider referral for facet block/RFA versus cervical MIMI  -Consider repeat EMG in the future  -Follow-up as needed for repeat trigger point injections, message me on mychart           The patient expressed understanding and agreement with the assessment and plan. Patient encouraged to contact us should they have any questions, concerns, or any changes in symptoms.      Thank you for allowing me to participate in the care of your patient.        ** Dictated with voice recognition software, please forgive any errors in grammar and/or spelling **         Chief Complaint     Neck pain follow-up      History of Present Illness    This is a pleasant 58-year-old right-handed woman with past medical history significant for anxiety, hypothyroidism, who presents for follow-up neck pain.     She was last seen here on 6/18/2024, at which point I did repeat upper back and neck trigger point injections bilaterally.  Previously they helped about 75%, and this time also 75% and lasted 3 months. She would like more today    I advised her to try gabapentin more regularly    She rates her pain as a 6/10, previously   4/10.      Otherwise, there have been no changes to her medications or past medical history since last visit.     ________________________  2/24/2023: Bilateral neck trigger point injections, try patches, consider other medications, consider other injections, consider Botox  3/24/2023: : Bilateral neck trigger point injections, try patches, consider other medications, consider other injections, consider Botox   5/24/2023: Bilateral neck xeomin injections,consider other medications, consider other injections, continue HEP  6/20/2023: We will increase Xeomin slightly next time, consider other injections and medications, continue HEP  7/18/23: Bilateral neck xeomin injections,consider other medications, consider other injections, continue HEP, slightly more xeomin next time in some of the muscles  8/25/2023: Bilateral neck trigger point injections, continue medications, consider other injections, continue exercises  12/28/2023:Bilateral neck Xeomin injections, continue medications, consider other injections, continue exercises  1/25/2024:Bilateral upper back and neck trigger point injections, Consider gabapentin, consider other medications, consider referral for deeper neck injections, consider Botox versus Dysport , Continue exercises  3/8/2024:trial of gabapentin, try Robaxin, consider other medications, consider referral for deeper neck injections, consider Botox versus Dysport , Continue exercises, follow up as needed  3/22/2024: Bilateral upper back and neck trigger point injections, trial of gabapentin, try Robaxin, consider other medications, consider referral for deeper neck injections, consider Botox versus Dysport , Continue exercises, follow up as needed  6/18/2024: Bilateral upper back and neck trigger point injections, trial of gabapentin, consider other medications, consider referral for deeper neck injections, consider Botox versus Dysport , Continue exercises, follow up as needed  10/9/24:  Bilateral  "upper back and neck trigger point injections, same as above  ______________________  As a reminder:     TIMELINE OF COMPLAINT(S):     She has been having neck pain on and off for the past 25 to 30 years. There was no inciting or traumatic event, but in her early 30s, she was knocked over by a sled rider and fell onto her head. She remembers starting to have neck issues after that. She said \"that is how I got my straight neck\". For the past 5 years, things have been getting worse slowly and she is now finding herself taking ibuprofen every other day and Flexeril a couple times a week at night which helps. The pain can radiate into her right temple at times and be associated with nausea as well.     It has been affecting her day-to-day life, work life, and sleep. Her primary care physician sent her here to consider trigger point injections.     Of note, she had right carpal tunnel release with Dr. Rios in 2011, helped.     Pain:  LOCATION-bilateral lateral neck R>L  RADIATION- Trapz feel tight  ASSOCIATED WITH- nausea, no falls  NUMBNESS/TINGLING-sometimes both hands feel tingling in the morning for the past 6 months  WEAKNESS-no  CONSTANT or INTERMITTENT-intermittent, but can wake up at night from it  SEVERITY/QUANTITY- 4/10, 7/10 at its worst, last time a couple of weeks ago  QUALITY- achy, pressure, gnawing  EXACERBATED BY- sometimes worse in the mornings, time  BETTER WITH- ibuprofen, heat, flexeril  TRIED-   Anti-Inflammatories: ibuprofen helps, Medrol Dosepak (not for this), previously meloxicam (doesn't remember) Celebrex (increases appetite)   Muscle relaxants: Flexeril helps   Anti-depressants:   Neuroleptics:   LDN:     PHYSICAL THERAPY: Several years ago, helped a little, still does HEP several times a week, does weighs at the gym on her own.  TENS unit: Yes, hasn't helped for neck. Helped for \"sciatica\"  CHIROPRACTIC MANIPULATION: Yes, temp relief  ACUPUNCTURE TREATMENTS: Yes, temp relief  DEEP TISSUE " MASSAGE THERAPY: Yes, temp relief  OSTEOPATHIC MANIPULATION THERAPY: yes, Dr. Jean helps but wears off  INJECTIONS: R C-2-3-4 Cervical facet blocks Dr. Urbina 2/5/19, helped for several months but caused some some vertigo temp.   EMG/NCS: Dr. Son 4/26/11: Mild R CTS     IMAGING:     Cervical MRI 1/30/2023: Multilevel degenerative disc disease and facet arthrosis without significant spinal canal stenosis. There is moderate neuroforaminal narrowing at C6/C7, slightly increased degenerative changes since MRI from 2017.     Cervical spine x-ray 2/16/2017: Mild degenerative changes in several levels. There is straightening of the upper cervical curvature.     FUNCTIONAL HISTORY: The patient is independent in all ADLs, mobility, and driving. The patient does not use any assistive device.     SH:  Lives in: [place]  Lives with:   Occupation: Phlebotomist  Tobacco: No  Alcohol: Social, weekends  Drugs: No     _________________________  ROS: The patient denies any bowel or bladder incontinence/accidents, night sweats, fevers, chills, recent significant weight loss. A 14 point review of systems was reviewed with the patient and is as above and otherwise negative. ROS questionnaire positive for muscle pain/tightness, limited range of motion neck      Physical Exam  GEN - Alert, well-developed, well-nourished, no acute distress  PSYCH - Cooperative, appropriate mood and affect  HEENT - NC/AT  RESP - Non-labored respirations, equal expansion  CV - warm and well-perfused, No cyanosis or edema in extremities.   ABD- soft, ND  SKIN - No rash.     Tenderness in bilateral scalene and SCM muscles     Previous NECK/EXTR- Cervical ROM is full with forward flexion, extension, rotation, but limited in sidebending right worse than left, with provocation of discomfort but no significant pain. There was discomfort with facet loading bilaterally.. Spurlings and Lhermitte's negative. No tenderness of the cervical spinous  processes. No tenderness of the cervical paraspinal muscles and trapezei musculature but there was tenderness of bilateral scalene, SCM muscles.      Previous NEURO - UE strength 5/5 - including shoulder abduction, biceps, triceps (except 5 -/5 on the right), wrist extensors, finger flexors, interossei, and    Sensation - intact to light touch in bilateral upper extremities.   Reflexes - 2+ biceps, brachioradialis, triceps, reflexes bilaterally, Staley's negative bilaterally  GAIT - Normal base, normal stride length, non-antalgic. Able to toe walk and heel walk without difficulty.     PROCEDURE:    Lidocaine 1%- 7 cc's used, 1 cc's wasted  200mg/20mL (10mg/mL)  NDC 1447-3443-66  LOT [id number]  EXP 02/01/2025  [facility]    Cervical and Thoracic trigger point injections:      Description of the Procedure: The procedure, risks and alternative treatments were discussed with the patient. After written informed consent was obtained, the trigger points in the cervical paraspinal levator scapulae, upper trapezius, scalene muscles were palpated and marked. The skin was prepped three times with alcohol. Using a 27 gauge 1.5 inch needle, after negative aspiration, the trigger points in each muscle were injected with a total of 7 cc's of 1% lidocaine, spread equally into 7 sites. Twitch responses were observed in the musculature. The patient tolerated the procedure well with no immediate complications or bleeding.      Plan:   1. The patient was instructed in post-procedural care.  2. The patient was asked to apply moist heat and or ice for the next 24 hours and to perform daily gentle stretching exercises.   Physical Exam  HENT:      Head:

## 2024-10-09 NOTE — PATIENT INSTRUCTIONS
-Ice on and off for the next 24 hours if injection sites are sore. Do gentle range of motion exercises in each area that was injected. Try to do them every hour for about half a minute or so, in every direction that the affected part goes. No pool, bath, or hot tub today. Avoid heavy lifting for the next 2 days.    -Consider gabapentin regularly  -Continue Flexeril as needed   -consider other medications in the future including medrol dose pack  -Consider switching to botox or dysport  -Continue daily home exercises and strength training  -Consider referral for facet block/RFA versus cervical MIMI  -Consider repeat EMG in the future  -Follow-up as needed for repeat trigger point injections, message me on [email or web address]

## 2024-10-16 ENCOUNTER — PATIENT MESSAGE (OUTPATIENT)
Dept: OBSTETRICS AND GYNECOLOGY | Facility: CLINIC | Age: 59
End: 2024-10-16
Payer: COMMERCIAL

## 2024-10-16 DIAGNOSIS — N95.2 ATROPHIC VAGINITIS: Primary | ICD-10-CM

## 2024-10-16 RX ORDER — ESTRADIOL 4 UG/1
4 INSERT VAGINAL 2 TIMES WEEKLY
Qty: 24 EACH | Refills: 3 | Status: SHIPPED | OUTPATIENT
Start: 2024-10-17

## 2024-12-13 DIAGNOSIS — U07.1 COVID-19: Primary | ICD-10-CM

## 2024-12-13 RX ORDER — AZITHROMYCIN 250 MG/1
TABLET, FILM COATED ORAL
Qty: 6 TABLET | Refills: 0 | Status: SHIPPED | OUTPATIENT
Start: 2024-12-13 | End: 2024-12-18

## 2024-12-13 RX ORDER — ALBUTEROL SULFATE 90 UG/1
2 INHALANT RESPIRATORY (INHALATION) EVERY 4 HOURS PRN
Qty: 8.5 G | Refills: 5 | Status: SHIPPED | OUTPATIENT
Start: 2024-12-13 | End: 2025-12-13

## 2024-12-13 RX ORDER — BENZONATATE 100 MG/1
100 CAPSULE ORAL 3 TIMES DAILY PRN
Qty: 42 CAPSULE | Refills: 0 | Status: SHIPPED | OUTPATIENT
Start: 2024-12-13 | End: 2025-01-12

## 2024-12-13 RX ORDER — FLUTICASONE PROPIONATE 50 MCG
1 SPRAY, SUSPENSION (ML) NASAL DAILY
Qty: 16 G | Refills: 5 | Status: SHIPPED | OUTPATIENT
Start: 2024-12-13 | End: 2025-12-13

## 2025-01-06 PROCEDURE — RXMED WILLOW AMBULATORY MEDICATION CHARGE

## 2025-01-10 ENCOUNTER — PHARMACY VISIT (OUTPATIENT)
Dept: PHARMACY | Facility: CLINIC | Age: 60
End: 2025-01-10
Payer: COMMERCIAL

## 2025-02-04 ENCOUNTER — APPOINTMENT (OUTPATIENT)
Dept: PRIMARY CARE | Facility: CLINIC | Age: 60
End: 2025-02-04
Payer: COMMERCIAL

## 2025-02-04 VITALS
DIASTOLIC BLOOD PRESSURE: 80 MMHG | SYSTOLIC BLOOD PRESSURE: 136 MMHG | BODY MASS INDEX: 25.29 KG/M2 | WEIGHT: 152 LBS | OXYGEN SATURATION: 97 % | HEART RATE: 72 BPM

## 2025-02-04 DIAGNOSIS — E03.9 HYPOTHYROIDISM, UNSPECIFIED TYPE: ICD-10-CM

## 2025-02-04 DIAGNOSIS — M25.50 POLYARTHRALGIA: ICD-10-CM

## 2025-02-04 DIAGNOSIS — Z12.31 ENCOUNTER FOR SCREENING MAMMOGRAM FOR MALIGNANT NEOPLASM OF BREAST: ICD-10-CM

## 2025-02-04 DIAGNOSIS — Z00.00 ROUTINE GENERAL MEDICAL EXAMINATION AT A HEALTH CARE FACILITY: Primary | ICD-10-CM

## 2025-02-04 PROCEDURE — 99396 PREV VISIT EST AGE 40-64: CPT | Performed by: FAMILY MEDICINE

## 2025-02-04 PROCEDURE — 1036F TOBACCO NON-USER: CPT | Performed by: FAMILY MEDICINE

## 2025-02-04 PROCEDURE — 99213 OFFICE O/P EST LOW 20 MIN: CPT | Performed by: FAMILY MEDICINE

## 2025-02-04 ASSESSMENT — PATIENT HEALTH QUESTIONNAIRE - PHQ9
1. LITTLE INTEREST OR PLEASURE IN DOING THINGS: NOT AT ALL
SUM OF ALL RESPONSES TO PHQ9 QUESTIONS 1 AND 2: 0
2. FEELING DOWN, DEPRESSED OR HOPELESS: NOT AT ALL

## 2025-02-04 NOTE — PATIENT INSTRUCTIONS
Labs on 7/6/14  was reviewed.     Lets recheck thyroid and inflammatory markers     Mammogram due again after 2/20/25     Colon screening due in 2028    Please continue the TENS unit as needed to the upper back and neck     BP was a little elevated today at 136/80, goal is less than 130/80.     Please continue the magnesium glycinate 400 mg at bedtime    Please also continue the sertraline 100 mg daily.     Please continue the propranolol at bedtime for the palpations, anxiety and insomnia.     Follow up in 6 months or sooner as needed.
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
## 2025-02-12 ENCOUNTER — APPOINTMENT (OUTPATIENT)
Dept: PHYSICAL MEDICINE AND REHAB | Facility: CLINIC | Age: 60
End: 2025-02-12
Payer: COMMERCIAL

## 2025-04-04 DIAGNOSIS — E03.9 HYPOTHYROIDISM, UNSPECIFIED TYPE: ICD-10-CM

## 2025-04-04 DIAGNOSIS — F41.9 ANXIETY: ICD-10-CM

## 2025-04-04 RX ORDER — LEVOTHYROXINE SODIUM 25 UG/1
25 TABLET ORAL DAILY
Qty: 90 TABLET | Refills: 3 | Status: SHIPPED | OUTPATIENT
Start: 2025-04-04 | End: 2026-04-04

## 2025-04-04 RX ORDER — SERTRALINE HYDROCHLORIDE 25 MG/1
TABLET, FILM COATED ORAL
Qty: 360 TABLET | Refills: 3 | Status: SHIPPED | OUTPATIENT
Start: 2025-04-04 | End: 2026-04-04

## 2025-04-18 LAB
ANA PAT SER IF-IMP: ABNORMAL
ANA PAT SER IF-IMP: ABNORMAL
ANA SER QL IF: POSITIVE
ANA TITR SER IF: ABNORMAL TITER
ANA TITR SER IF: ABNORMAL TITER
CRP SERPL-MCNC: <3 MG/L
DSDNA AB SER-ACNC: <1 IU/ML
ENA RNP AB SER-ACNC: ABNORMAL AI
ENA SM AB SER IA-ACNC: ABNORMAL AI
ENA SM+RNP AB SER IA-ACNC: ABNORMAL AI
ERYTHROCYTE [SEDIMENTATION RATE] IN BLOOD BY WESTERGREN METHOD: 2 MM/H
LABORATORY COMMENT REPORT: ABNORMAL
NUCLEOSOME AB SER IA-ACNC: ABNORMAL AI
RHEUMATOID FACT SERPL-ACNC: <10 IU/ML
TSH SERPL-ACNC: 3.17 MIU/L (ref 0.4–4.5)

## 2025-04-21 DIAGNOSIS — M25.50 ARTHRALGIA, UNSPECIFIED JOINT: Primary | ICD-10-CM

## 2025-06-10 ENCOUNTER — HOSPITAL ENCOUNTER (OUTPATIENT)
Dept: RADIOLOGY | Facility: CLINIC | Age: 60
Discharge: HOME | End: 2025-06-10
Payer: COMMERCIAL

## 2025-06-10 VITALS — WEIGHT: 152 LBS | HEIGHT: 65 IN | BODY MASS INDEX: 25.33 KG/M2

## 2025-06-10 DIAGNOSIS — Z12.31 ENCOUNTER FOR SCREENING MAMMOGRAM FOR MALIGNANT NEOPLASM OF BREAST: ICD-10-CM

## 2025-06-10 PROCEDURE — 77063 BREAST TOMOSYNTHESIS BI: CPT | Performed by: RADIOLOGY

## 2025-06-10 PROCEDURE — 77067 SCR MAMMO BI INCL CAD: CPT | Performed by: RADIOLOGY

## 2025-06-10 PROCEDURE — 77067 SCR MAMMO BI INCL CAD: CPT

## 2025-10-21 ENCOUNTER — APPOINTMENT (OUTPATIENT)
Dept: RHEUMATOLOGY | Facility: CLINIC | Age: 60
End: 2025-10-21
Payer: COMMERCIAL